# Patient Record
Sex: FEMALE | Race: WHITE | Employment: OTHER | URBAN - METROPOLITAN AREA
[De-identification: names, ages, dates, MRNs, and addresses within clinical notes are randomized per-mention and may not be internally consistent; named-entity substitution may affect disease eponyms.]

---

## 2018-06-25 ENCOUNTER — COMPLETE SKIN EXAM (OUTPATIENT)
Dept: URBAN - METROPOLITAN AREA CLINIC 14 | Facility: CLINIC | Age: 53
Setting detail: DERMATOLOGY
End: 2018-06-25

## 2018-06-25 PROCEDURE — 99214 OFFICE O/P EST MOD 30 MIN: CPT

## 2021-01-06 ENCOUNTER — OTHER- (OUTPATIENT)
Dept: URBAN - METROPOLITAN AREA CLINIC 14 | Facility: CLINIC | Age: 56
Setting detail: DERMATOLOGY
End: 2021-01-06

## 2021-01-06 DIAGNOSIS — D48.5 NEOPLASM OF UNCERTAIN BEHAVIOR OF SKIN: ICD-10-CM

## 2021-01-06 PROCEDURE — 99212 OFFICE O/P EST SF 10 MIN: CPT

## 2021-03-08 ENCOUNTER — OTHER- (OUTPATIENT)
Dept: URBAN - METROPOLITAN AREA CLINIC 14 | Facility: CLINIC | Age: 56
Setting detail: DERMATOLOGY
End: 2021-03-08

## 2021-03-08 DIAGNOSIS — B07.8 OTHER VIRAL WARTS: ICD-10-CM

## 2021-03-08 PROCEDURE — OTHER COSMETIC: OTHER

## 2021-04-07 ENCOUNTER — WEB ENCOUNTER (OUTPATIENT)
Dept: URBAN - METROPOLITAN AREA CLINIC 86 | Facility: CLINIC | Age: 56
End: 2021-04-07

## 2021-04-09 ENCOUNTER — WEB ENCOUNTER (OUTPATIENT)
Dept: URBAN - METROPOLITAN AREA CLINIC 86 | Facility: CLINIC | Age: 56
End: 2021-04-09

## 2021-04-11 ENCOUNTER — TELEPHONE ENCOUNTER (OUTPATIENT)
Dept: URBAN - METROPOLITAN AREA CLINIC 92 | Facility: CLINIC | Age: 56
End: 2021-04-11

## 2021-04-12 ENCOUNTER — LAB OUTSIDE AN ENCOUNTER (OUTPATIENT)
Dept: URBAN - METROPOLITAN AREA TELEHEALTH 2 | Facility: TELEHEALTH | Age: 56
End: 2021-04-12

## 2021-04-12 ENCOUNTER — WEB ENCOUNTER (OUTPATIENT)
Dept: URBAN - METROPOLITAN AREA CLINIC 86 | Facility: CLINIC | Age: 56
End: 2021-04-12

## 2021-04-12 ENCOUNTER — OFFICE VISIT (OUTPATIENT)
Dept: URBAN - METROPOLITAN AREA TELEHEALTH 2 | Facility: TELEHEALTH | Age: 56
End: 2021-04-12
Payer: COMMERCIAL

## 2021-04-12 DIAGNOSIS — R53.83 FATIGUE: ICD-10-CM

## 2021-04-12 DIAGNOSIS — K76.89 LIVER LESION: ICD-10-CM

## 2021-04-12 DIAGNOSIS — R10.84 ABDOMINAL CRAMPING, GENERALIZED: ICD-10-CM

## 2021-04-12 DIAGNOSIS — R93.5 ABNORMAL MRI OF ABDOMEN: ICD-10-CM

## 2021-04-12 PROCEDURE — 99244 OFF/OP CNSLTJ NEW/EST MOD 40: CPT

## 2021-04-12 PROCEDURE — 99204 OFFICE O/P NEW MOD 45 MIN: CPT

## 2021-04-12 RX ORDER — TESTOSTERONE 100 MG
112 MG PELLET (EA) IMPLANTATION ONCE
Status: ACTIVE | COMMUNITY

## 2021-04-12 RX ORDER — THYROID,PORK 100 % USP
10 MCG POWDER (GRAM) MISCELLANEOUS ONCE A DAY
Status: ACTIVE | COMMUNITY

## 2021-04-12 NOTE — HPI-TODAY'S VISIT:
Pt is wife of Jorge Ling who is our pt as well and she is 55 yo Female and she is being Referred Dr Sowmya Smith in [place] and coming in for liver lesions.  A copy of the note will be sent to the referring provider.  She says liver lesions first noted 3-18 with hospital for chest pain and she did a ct and saw a liver lesion.   they wnated her to do test and to do mri.   She decided to leave as hospital busy.  Mri showed 2 lesions one larger to center and to left of the liver 8cm in size by 3cm and she other almost and several other small ones.  She says when went to doctor saw covering doctor.   She says not needing follow up and covering doctor said her pain was there.  They suggested benign hepatic lesion but not specific.  We need the ct and mri discs so that we can get the info that we need.  Need to get the discs reread here.  She was prior years ago on ocp and she was on march 3 was on estradiol patch and march 17 had developed pain and stopped march 24 the week after and then stopped it then.  She says she is perimenopause.  Plan: 1. Need to get the missing records sent in or found. 2. She carole get the discs of the ct and mri bruned onto disc and need send that and the reports. 3. If the mri is not clear then we can consider redo now or in 3m or 6m or do with eovist. 4. If the lesions are benign and in pain then we need to look options for tx.  Cautioned pt re the pandemic to use strict hand washing, wear mask even if the covid 19 vaccine is or has not been obtained, socially distance, and look to the cdc web page or announcements for  updates as this a moving target and lots of updates coming at us re the COVID 19.  Duration of the visit 50 min with 10 min of prep time looking for info re the pt and checking for records in office and then the 40 min from 820 to 900 am as healow clock fluxed with internet fluxes for the clinic telemed visit via [employer] with time off due to inetrnet fluxing.   CC: Dr Sowmya Le MD Address: [address] [place] [address] [address], [place] [address] Phone: [phone]

## 2021-04-13 ENCOUNTER — WEB ENCOUNTER (OUTPATIENT)
Dept: URBAN - METROPOLITAN AREA CLINIC 86 | Facility: CLINIC | Age: 56
End: 2021-04-13

## 2021-04-13 ENCOUNTER — TELEPHONE ENCOUNTER (OUTPATIENT)
Dept: URBAN - METROPOLITAN AREA CLINIC 92 | Facility: CLINIC | Age: 56
End: 2021-04-13

## 2021-04-13 LAB
A/G RATIO: 1.8
ALBUMIN: 4.6
ALKALINE PHOSPHATASE: 90
ALT (SGPT): 22
AST (SGOT): 17
BASO (ABSOLUTE): 0.1
BASOS: 1
BILIRUBIN, TOTAL: 0.6
BUN/CREATININE RATIO: 20
BUN: 14
CALCIUM: 9.5
CARBON DIOXIDE, TOTAL: 22
CHLORIDE: 102
CREATININE: 0.69
EGFR IF AFRICN AM: 114
EGFR IF NONAFRICN AM: 99
EOS (ABSOLUTE): 0.1
EOS: 1
GLOBULIN, TOTAL: 2.6
GLUCOSE: 129
HBSAG SCREEN: NEGATIVE
HCV AB: <0.1
HEMATOCRIT: 41.9
HEMATOLOGY COMMENTS:: (no result)
HEMOGLOBIN: 13.6
HEP A AB, TOTAL: NEGATIVE
HEP B CORE AB, TOT: NEGATIVE
HEPATITIS B SURF AB QUANT: <3.1
IMMATURE CELLS: (no result)
IMMATURE GRANS (ABS): 0
IMMATURE GRANULOCYTES: 0
INTERPRETATION:: (no result)
LYMPHS (ABSOLUTE): 3.1
LYMPHS: 32
MCH: 28.3
MCHC: 32.5
MCV: 87
MONOCYTES(ABSOLUTE): 0.5
MONOCYTES: 5
NEUTROPHILS (ABSOLUTE): 5.9
NEUTROPHILS: 61
NRBC: (no result)
PLATELETS: 349
POTASSIUM: 4.8
PROTEIN, TOTAL: 7.2
RBC: 4.8
RDW: 13
SODIUM: 140
WBC: 9.7

## 2021-04-13 NOTE — HPI-TODAY'S VISIT:
April 12 labs show white blood cell count 9.7 hemoglobin 13.6 platelet count 349.  MCV 87.  Neutrophils were normal at 5.9.  Sodium 140 potassium 4.8 chloride 102 CO2 22 calcium 9.5 albumin 4.6 bilirubin 0.6 alkaline phosphatase 90 AST 17 ALT of 22.  Ideal ALT is less than 25.  Glucose was elevated at 129.  Maybe not fasting?  Show local provider. BUN of 14 creatinine 0.69.  Had a immunity not seen.  Hep B immunity not seen.  Hep B core total negative.  Hep B surface antigen negative.  Would recommend that you get the hepatitis A and B vaccine series.  It comes as a combined vaccine called Twinrix and you have to get it at 0, 1, and 6 months to have a good chance for immunity to the form.  Remember if you get the new coronavirus vaccine you have to not have any other vaccines given to you for 2 weeks before or after any other coronavirus vaccine doses. Hep C antibody was negative. Await the scans to have these reviewed by radiology here.

## 2021-04-14 ENCOUNTER — WEB ENCOUNTER (OUTPATIENT)
Dept: URBAN - METROPOLITAN AREA CLINIC 86 | Facility: CLINIC | Age: 56
End: 2021-04-14

## 2021-04-16 ENCOUNTER — WEB ENCOUNTER (OUTPATIENT)
Dept: URBAN - METROPOLITAN AREA CLINIC 86 | Facility: CLINIC | Age: 56
End: 2021-04-16

## 2021-04-17 ENCOUNTER — TELEPHONE ENCOUNTER (OUTPATIENT)
Dept: URBAN - METROPOLITAN AREA CLINIC 92 | Facility: CLINIC | Age: 56
End: 2021-04-17

## 2021-04-17 ENCOUNTER — LAB OUTSIDE AN ENCOUNTER (OUTPATIENT)
Dept: URBAN - METROPOLITAN AREA CLINIC 92 | Facility: CLINIC | Age: 56
End: 2021-04-17

## 2021-04-17 NOTE — HPI-TODAY'S VISIT:
Quinten Ling, CT chest April 15 review by radiology here suggested she have three-vessel aortic arch noted.  No significant atherosclerotic disease was seen.  No significant coronary artery calcification was seen.  Normal caliber to the main pulmonary artery was seen.  There was no central filling defect that was seen.  Heart size was normal with no pericardial effusion.  Did see a small hiatal hernia.  No pathologically enlarged lymph nodes were seen.  The airways were patent.  They did see a cluster of micronodules within the right lower lobe which they said could be infectious or inflammatory etiology of the recommended attention on follow-up.  I would recommend that you follow-up with your local doctor regarding that.  They did mention again that they saw the multiple liver lesions largest being in segment 7/8.  They also saw some fluid attenuating simple appearing renal cyst within the right kidney up to 1.5 x 1.5 cm. MRI also read on April 15.  We mentioned that the liver had no fat or prior.  They did mention that intralesional T2 hyperintense stellate-like scars were seen in the hands and delayed phases and the larger lesions.  No evidence of intralesional fat was seen.  Several of the smaller lesions are visible on the diffusion sequences and/or the CT.  Examples I gave her in segment 8 of 1.7 x 7.2 cm, segment 7 had a 5.6 x 3.3 cm 1, in segment 1 these are 1.3 x 1.9 cm 1. An exophytic 6.5 x 7.9 cm lesion was seen in the gastrosplenic ligament adjacent to the left lobe and the greater curvature of the stomach.  This 1 also had the central stellate-like scar that was seen.  The CT much better demonstrates a left hepatic arterial branches coursing into this exophytic lesion proving its hepatic origin.  Lesion of similar intensity was seen in segment 5/6 measuring 2.3 x 3.6 cm as well. The biliary tree appeared to be normal, spleen was normal, pancreas was normal, kidney showed a renal cyst. They felt that these lesions in the liver are very suggestive of focal nodular hyperplasia.  The largest being an exophytic lesion arising from the left lobe measuring 7.9 cm.  The did recommend further evaluation with a Eovist for definitive diagnosis. Typically what this would do would be to do the neck scan now as an Eovist exam to be sure and we will put in the order for this.  Spoke with pt: Explained need the mri with eovist to be done to better see the liver lesions.  Also mentioned to have primary MD review the chest ct nonspecific findings seen.  Dr. Hernandez

## 2021-04-18 ENCOUNTER — TELEPHONE ENCOUNTER (OUTPATIENT)
Dept: URBAN - METROPOLITAN AREA CLINIC 92 | Facility: CLINIC | Age: 56
End: 2021-04-18

## 2021-04-18 NOTE — HPI-TODAY'S VISIT:
Quinten Ling, April 8 labs:  Sodium 139 k 4.2 chloride 104 CO2 25 glucose 96 BUN of 18 creatinine 0.8 calcium 9.3 corrected calcium 9.4 albumin 3.9 bilirubin 0.6 alkaline phosphatase 70 AST 17 ALT of 18.  Liver enzymes are at ideal level.  Beta natruretic peptide was normal at 13.  Lipase was normal at 13.  Magnesium was normal at 2.1.  TSH 1.832.  White count 10.6 hemoglobin 12.7 plate count 341.  Neutrophils were 5.8.  MCV normal at 90.5.  INR normal 1.0.  Saw also echocardiogram dated April 5 shows an ejection fraction of 55 to 60% right ventricle normal in size and function.  Dr. Hernandez

## 2021-04-20 ENCOUNTER — WEB ENCOUNTER (OUTPATIENT)
Dept: URBAN - METROPOLITAN AREA CLINIC 86 | Facility: CLINIC | Age: 56
End: 2021-04-20

## 2021-04-22 ENCOUNTER — TELEPHONE ENCOUNTER (OUTPATIENT)
Dept: URBAN - METROPOLITAN AREA CLINIC 86 | Facility: CLINIC | Age: 56
End: 2021-04-22

## 2021-05-04 ENCOUNTER — OFFICE VISIT (OUTPATIENT)
Dept: URBAN - METROPOLITAN AREA CLINIC 86 | Facility: CLINIC | Age: 56
End: 2021-05-04

## 2021-05-10 ENCOUNTER — WEB ENCOUNTER (OUTPATIENT)
Dept: URBAN - METROPOLITAN AREA CLINIC 86 | Facility: CLINIC | Age: 56
End: 2021-05-10

## 2021-05-12 ENCOUNTER — WEB ENCOUNTER (OUTPATIENT)
Dept: URBAN - METROPOLITAN AREA CLINIC 86 | Facility: CLINIC | Age: 56
End: 2021-05-12

## 2021-05-17 ENCOUNTER — WEB ENCOUNTER (OUTPATIENT)
Dept: URBAN - METROPOLITAN AREA CLINIC 86 | Facility: CLINIC | Age: 56
End: 2021-05-17

## 2021-05-17 ENCOUNTER — TELEPHONE ENCOUNTER (OUTPATIENT)
Dept: URBAN - METROPOLITAN AREA CLINIC 92 | Facility: CLINIC | Age: 56
End: 2021-05-17

## 2021-05-17 NOTE — HPI-TODAY'S VISIT:
She called re netta. Liver test abnormalities have been reported to occur in less than 1% of patients on citalopram, and elevations are usually modest and rarely require dose modification or discontinuation. Rare instances of acute, clinically apparent episodes of liver injury with marked liver enzyme elevations with or without jaundice have been reported in patients on citalopram and escitalopram. The typical presentation is with fatigue, nausea and abdominal pain 2 to 10 weeks after starting the medication, followed by dark urine and mild jaundice. Both cholestatic and hepatocellular patterns of serum enzyme elevations have been described. Autoimmune (autoantibodies) and immunoallergic features (rash, fever, eosinophilia) are uncommon. Recovery is usually rapid once the agent is stopped.  Likelihood score: C (probable cause of clinically apparent liver injury).  Would delay visit to when  mri set up.  We need to follow labs occ to see how doing it. Typically do labs in 1m and 3m on any new med.

## 2021-05-25 ENCOUNTER — TELEPHONE ENCOUNTER (OUTPATIENT)
Dept: URBAN - METROPOLITAN AREA CLINIC 92 | Facility: CLINIC | Age: 56
End: 2021-05-25

## 2021-05-25 NOTE — HPI-TODAY'S VISIT:
Quinten Ling, May 20 MRI done with Eovist. They see the liver has no fat or iron but with multiple lesions seen. Some of them have clear central scars with persistent enhancement favoring focal nodular hyperplasia such as in segment 8 given 8.2 x 6.8 cm lesion, segment 7 a 7.2 x 4.2 cm lesion and in segment two 2.1 x 1.6 cm lesion. Additional lesions have internal fat and washout on delayed imaging which favors hepatic adenomas this includes the dominant lesion in segment 2 measuring 8.9 x 8.2 cm as well as additional lesions in segment 4A which was 2.5 x 1.9 and in segment three 2.3 x 1.9 cm and in segment eight 2.4 x 1.3 cm and she 6 1.2x0.8cm.  Additional subcentimeter lesions seen throughout the liver. GB and biliary tree normal. Spleen normal and kidneys show small right renal cysts. Liver vessels patent.  Overall they felt you had a combination of focal nodular hyperplasias and adenomas.  They said that they did not have comparison films to compare to. Dr Hernandez

## 2021-06-01 ENCOUNTER — OFFICE VISIT (OUTPATIENT)
Dept: URBAN - METROPOLITAN AREA CLINIC 86 | Facility: CLINIC | Age: 56
End: 2021-06-01
Payer: COMMERCIAL

## 2021-06-01 DIAGNOSIS — N95.1 PERIMENOPAUSAL: ICD-10-CM

## 2021-06-01 DIAGNOSIS — Z71.89 VACCINE COUNSELING: ICD-10-CM

## 2021-06-01 DIAGNOSIS — Z83.71 FAMILY HISTORY OF POLYPS IN THE COLON: ICD-10-CM

## 2021-06-01 DIAGNOSIS — R93.5 ABNORMAL MRI OF ABDOMEN: ICD-10-CM

## 2021-06-01 DIAGNOSIS — R10.9 ABDOMINAL PAIN: ICD-10-CM

## 2021-06-01 DIAGNOSIS — Z79.899 HIGH RISK MEDICATION USE: ICD-10-CM

## 2021-06-01 DIAGNOSIS — K76.89 LIVER LESION: ICD-10-CM

## 2021-06-01 DIAGNOSIS — Z98.890 HISTORY OF COLONOSCOPY: ICD-10-CM

## 2021-06-01 DIAGNOSIS — E03.9 HYPOTHYROIDISM, UNSPECIFIED TYPE: ICD-10-CM

## 2021-06-01 DIAGNOSIS — R53.83 FATIGUE: ICD-10-CM

## 2021-06-01 PROCEDURE — 99214 OFFICE O/P EST MOD 30 MIN: CPT | Performed by: PHYSICIAN ASSISTANT

## 2021-06-01 RX ORDER — TESTOSTERONE 100 MG
112 MG PELLET (EA) IMPLANTATION ONCE
Status: ACTIVE | COMMUNITY

## 2021-06-01 RX ORDER — THYROID,PORK 100 % USP
10 MCG POWDER (GRAM) MISCELLANEOUS ONCE A DAY
Status: ACTIVE | COMMUNITY

## 2021-06-01 NOTE — HPI-TODAY'S VISIT:
This is a scheduled follow-up appointment for this patient, a 56 year old female, after a previous telehealth visit in April 2021, initially referred by Dr. Sowmya Smith in [place] for an evaluation for multiple liver lesions. A copy of the note will be sent to the referring provider.   5/20/21 MRI with Eovist. They see the liver has no fat or iron but with multiple lesions seen. Some of them have clear central scars with persistent enhancement favoring focal nodular hyperplasia such as in segment 8 given 8.2 x 6.8 cm lesion, segment 7 a 7.2 x 4.2 cm lesion and in segment two 2.1 x 1.6 cm lesion. Additional lesions have internal fat and washout on delayed imaging which favors hepatic adenomas this includes the dominant lesion in segment 2 measuring 8.9 x 8.2 cm as well as additional lesions in segment 4A which was 2.5 x 1.9 and in segment three 2.3 x 1.9 cm and in segment eight 2.4 x 1.3 cm and she 6 1.2x0.8cm.  Additional subcentimeter lesions seen throughout the liver. GB and biliary tree normal. Spleen normal and kidneys show small right renal cysts. Liver vessels patent.  Overall they felt you had a combination of focal nodular hyperplasias and adenomas.   April 8 labs:  Sodium 139 k 4.2 chloride 104 CO2 25 glucose 96 BUN of 18 creatinine 0.8 calcium 9.3 corrected calcium 9.4 albumin 3.9 bilirubin 0.6 alkaline phosphatase 70 AST 17 ALT of 18.  Liver enzymes are at ideal level.  Beta natruretic peptide was normal at 13.  Lipase was normal at 13.  Magnesium was normal at 2.1.  TSH 1.832.  White count 10.6 hemoglobin 12.7 plate count 341.  Neutrophils were 5.8.  MCV normal at 90.5.  INR normal 1.0.  Saw also echocardiogram dated April 5 shows an ejection fraction of 55 to 60% right ventricle normal in size and function.  CT chest April 15 review by radiology here suggested she have three-vessel aortic arch noted.  No significant atherosclerotic disease was seen.  No significant coronary artery calcification was seen.  Normal caliber to the main pulmonary artery was seen.  There was no central filling defect that was seen.  Heart size was normal with no pericardial effusion.  Did see a small hiatal hernia.  No pathologically enlarged lymph nodes were seen.  The airways were patent.  They did see a cluster of micronodules within the right lower lobe which they said could be infectious or inflammatory etiology of the recommended attention on follow-up.  I would recommend that you follow-up with your local doctor regarding that.  They did mention again that they saw the multiple liver lesions largest being in segment 7/8.  They also saw some fluid attenuating simple appearing renal cyst within the right kidney up to 1.5 x 1.5 cm.  MRI also read on April 15.  We mentioned that the liver had no fat or prior.  They did mention that intralesional T2 hyperintense stellate-like scars were seen in the hands and delayed phases and the larger lesions.  No evidence of intralesional fat was seen.  Several of the smaller lesions are visible on the diffusion sequences and/or the CT.  Examples I gave her in segment 8 of 1.7 x 7.2 cm, segment 7 had a 5.6 x 3.3 cm 1, in segment 1 these are 1.3 x 1.9 cm 1. An exophytic 6.5 x 7.9 cm lesion was seen in the gastrosplenic ligament adjacent to the left lobe and the greater curvature of the stomach.  This 1 also had the central stellate-like scar that was seen.  The CT much better demonstrates a left hepatic arterial branches coursing into this exophytic lesion proving its hepatic origin.  Lesion of similar intensity was seen in segment 5/6 measuring 2.3 x 3.6 cm as well. The biliary tree appeared to be normal, spleen was normal, pancreas was normal, kidney showed a renal cyst. They felt that these lesions in the liver are very suggestive of focal nodular hyperplasia.  The largest being an exophytic lesion arising from the left lobe measuring 7.9 cm.  The did recommend further evaluation with a Eovist for definitive diagnosis.  Spoke with pt: Explained need the mri with eovist to be done to better see the liver lesions.  Also mentioned to have primary MD review the chest ct nonspecific findings seen.  She stays liver lesions first noted 3/18 with hospital for epigastric/chest pain.  Went to [place] [facility] and CT showed a liver lesion.  MRI showed 2 lesions, one larger to center and to left of the liver 8 x 3 cm and several other smaller ones.    Normal cardiac work up.  She has hx of OCP use many  years ago (about 2 years total) and was on estradiol patch March 3.  On March 17, she developed pain and stopped  patch March 24.  Testosterone pellets x 1.5 years ago, last in Feb 2021.  ALso hx of progesterone supplementation with one prior pregnancy.     Abdominal pain is epigastric, constant 1/10 but does sometimes gets worse.  Slouching or overeating can make pain worse.  Admits to early satiety.  Denies nausea/vomiting.  Admits to diarrhea.   No fevers/chills.  No weight loss.   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

## 2021-06-10 ENCOUNTER — TELEPHONE ENCOUNTER (OUTPATIENT)
Dept: URBAN - METROPOLITAN AREA CLINIC 6 | Facility: CLINIC | Age: 56
End: 2021-06-10

## 2021-08-09 ENCOUNTER — OFFICE VISIT (OUTPATIENT)
Dept: URBAN - METROPOLITAN AREA CLINIC 86 | Facility: CLINIC | Age: 56
End: 2021-08-09
Payer: COMMERCIAL

## 2021-08-09 DIAGNOSIS — D13.4 HEPATIC ADENOMA: ICD-10-CM

## 2021-08-09 DIAGNOSIS — Z79.899 HIGH RISK MEDICATION USE: ICD-10-CM

## 2021-08-09 DIAGNOSIS — R93.5 ABNORMAL MRI OF ABDOMEN: ICD-10-CM

## 2021-08-09 DIAGNOSIS — K76.89 LIVER LESION: ICD-10-CM

## 2021-08-09 DIAGNOSIS — E03.9 HYPOTHYROIDISM, UNSPECIFIED TYPE: ICD-10-CM

## 2021-08-09 DIAGNOSIS — N95.1 PERIMENOPAUSAL: ICD-10-CM

## 2021-08-09 DIAGNOSIS — R53.83 FATIGUE: ICD-10-CM

## 2021-08-09 DIAGNOSIS — Z83.71 FAMILY HISTORY OF POLYPS IN THE COLON: ICD-10-CM

## 2021-08-09 DIAGNOSIS — R10.9 ABDOMINAL PAIN: ICD-10-CM

## 2021-08-09 DIAGNOSIS — Z98.890 HISTORY OF COLONOSCOPY: ICD-10-CM

## 2021-08-09 DIAGNOSIS — Z71.89 VACCINE COUNSELING: ICD-10-CM

## 2021-08-09 PROCEDURE — 99214 OFFICE O/P EST MOD 30 MIN: CPT

## 2021-08-09 RX ORDER — THYROID,PORK 100 % USP
10 MCG POWDER (GRAM) MISCELLANEOUS ONCE A DAY
Status: ACTIVE | COMMUNITY

## 2021-08-09 RX ORDER — TESTOSTERONE 100 MG
112 MG PELLET (EA) IMPLANTATION ONCE
Status: ACTIVE | COMMUNITY

## 2021-08-26 ENCOUNTER — WEB ENCOUNTER (OUTPATIENT)
Dept: URBAN - METROPOLITAN AREA CLINIC 86 | Facility: CLINIC | Age: 56
End: 2021-08-26

## 2021-10-25 ENCOUNTER — WEB ENCOUNTER (OUTPATIENT)
Dept: URBAN - METROPOLITAN AREA CLINIC 86 | Facility: CLINIC | Age: 56
End: 2021-10-25

## 2021-10-29 ENCOUNTER — WEB ENCOUNTER (OUTPATIENT)
Dept: URBAN - METROPOLITAN AREA CLINIC 86 | Facility: CLINIC | Age: 56
End: 2021-10-29

## 2021-10-30 ENCOUNTER — LAB OUTSIDE AN ENCOUNTER (OUTPATIENT)
Dept: URBAN - METROPOLITAN AREA CLINIC 86 | Facility: CLINIC | Age: 56
End: 2021-10-30

## 2021-11-16 ENCOUNTER — TELEPHONE ENCOUNTER (OUTPATIENT)
Dept: URBAN - METROPOLITAN AREA CLINIC 92 | Facility: CLINIC | Age: 56
End: 2021-11-16

## 2021-11-16 LAB
A/G RATIO: 1.5
ALBUMIN: 4
ALKALINE PHOSPHATASE: 83
ALT (SGPT): 26
AST (SGOT): 20
BASO (ABSOLUTE): 0.1
BASOS: 1
BILIRUBIN, TOTAL: 0.4
BUN/CREATININE RATIO: 15
BUN: 10
CALCIUM: 9.1
CARBON DIOXIDE, TOTAL: 23
CHLORIDE: 99
CREATININE: 0.68
EGFR IF AFRICN AM: 115
EGFR IF NONAFRICN AM: 99
EOS (ABSOLUTE): 0.1
EOS: 1
GLOBULIN, TOTAL: 2.6
GLUCOSE: 89
HEMATOCRIT: 37.4
HEMATOLOGY COMMENTS:: (no result)
HEMOGLOBIN: 12.3
IMMATURE CELLS: (no result)
IMMATURE GRANS (ABS): 0
IMMATURE GRANULOCYTES: 0
INR: 1
LYMPHS (ABSOLUTE): 3
LYMPHS: 30
MCH: 27
MCHC: 32.9
MCV: 82
MONOCYTES(ABSOLUTE): 0.6
MONOCYTES: 6
NEUTROPHILS (ABSOLUTE): 6.1
NEUTROPHILS: 62
NRBC: (no result)
PLATELETS: 298
POTASSIUM: 4.3
PROTEIN, TOTAL: 6.6
PROTHROMBIN TIME: 10.4
RBC: 4.55
RDW: 14
SODIUM: 139
WBC: 9.9

## 2021-11-16 NOTE — HPI-TODAY'S VISIT:
Deajeramie Ling, November 15 labs show normal INR at 1.0 glucose 89 BUN of 10 creatinine 0.68 sodium 139 potassium 4.3 calcium 9.1 albumin 4.0 bilirubin 0.4 alkaline phosphatase 83 AST 20 and ALT 26.  These are in normal range with ideal ALT being slightly lower at 25 or less. White blood cell count 9.9 hemoglobin 12.3 platelet count 298 MCV 92 and neutrophils 6.1 lymphocytes 3.0. Overall these laboratories are all within lab normal range. Dr Hernandez

## 2021-12-27 ENCOUNTER — LAB OUTSIDE AN ENCOUNTER (OUTPATIENT)
Dept: URBAN - METROPOLITAN AREA CLINIC 86 | Facility: CLINIC | Age: 56
End: 2021-12-27

## 2021-12-28 LAB
A/G RATIO: 1.6
ALBUMIN: 4.2
ALKALINE PHOSPHATASE: 92
ALT (SGPT): 29
AST (SGOT): 20
BASO (ABSOLUTE): 0.2
BASOS: 2
BILIRUBIN, TOTAL: 0.5
BUN/CREATININE RATIO: 21
BUN: 12
CALCIUM: 9.3
CARBON DIOXIDE, TOTAL: 23
CHLORIDE: 102
CREATININE: 0.56
EGFR IF AFRICN AM: 122
EGFR IF NONAFRICN AM: 106
EOS (ABSOLUTE): 0.2
EOS: 2
GLOBULIN, TOTAL: 2.7
GLUCOSE: 89
HEMATOCRIT: 41.9
HEMATOLOGY COMMENTS:: (no result)
HEMOGLOBIN: 14.3
IMMATURE CELLS: (no result)
IMMATURE GRANS (ABS): 0
IMMATURE GRANULOCYTES: 0
LYMPHS (ABSOLUTE): 3.4
LYMPHS: 40
MCH: 28.3
MCHC: 34.1
MCV: 83
MONOCYTES(ABSOLUTE): 0.4
MONOCYTES: 5
NEUTROPHILS (ABSOLUTE): 4.3
NEUTROPHILS: 51
NRBC: (no result)
PLATELETS: 360
POTASSIUM: 4.4
PROTEIN, TOTAL: 6.9
RBC: 5.05
RDW: 14.3
SODIUM: 139
WBC: 8.5

## 2021-12-30 ENCOUNTER — TELEPHONE ENCOUNTER (OUTPATIENT)
Dept: URBAN - METROPOLITAN AREA CLINIC 92 | Facility: CLINIC | Age: 56
End: 2021-12-30

## 2021-12-30 NOTE — HPI-TODAY'S VISIT:
Quinten Ling, Dec 28 mri read out today: Lower thorax where seen showed only trace bilateral pleural effusions. Liver showed no fat or iron.  They saw interval resection since last mri of the previously dominant exophytic hepatic adenoma in segment 2/3 without any findings of local recurrence.   They still see multiple arterially enhancing lesions consistent with simple scar consistent consistent with focal nodular hyperplasia.  Overall these lesions are similar in size and appearance to the May 2021 study with the largest being an 8.2 x 6.8 cm lesion in segment 8, 5.2 x 3.6 cm in segment 7, 2.1 x 1.6 cm in segment 2, and 2.2 x 1.4 cm in the caudate lobe. Some other additional arterial enhancing lesions with internal fat seen that were felt to be favoring hepatic adenomas also seen including in segment 4A a 2.5 x 1.9 cm lesion, a segment three 2.3 x 1.9 lesion, segment eight 2.5 x 2.1 cm lesion, and segment 6 of 4.2 x 2.9 cm lesion as well. Multiple additional smaller scattered hepatic adenomas seen in the right and left hepatic lobes. Gallbladder/biliary tree were normal, as were spleen, pancreas, adrenals and lymph nodes. Kidneys showed renal cysts but no hydronephrosis.   Another new finding was this 3.4 x 4.9 cm right nonenhancing fluid collection seen in the lateral right breast. Small fat-containing ventral hernia seen as well as scattered vertebral body hemangiomas. Overall they saw interval resection of the segment 2/3 large exophytic hepatic adenoma since the last scan and without local recurrence.  They aslo saw a combination of FNH and adenomas that persist.  They suspect that you have a hematoma noted in the lateral right breast soft tissues. We need to redo the mri in 6m. Called the pt and she had an excisional biopsy done of the breast area. 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 She says offered tamoxifen and she is worried re about its effects and she says she is exploring other options including more surgery for this. Dr. Hernandez

## 2022-01-03 ENCOUNTER — OFFICE VISIT (OUTPATIENT)
Dept: URBAN - METROPOLITAN AREA TELEHEALTH 2 | Facility: TELEHEALTH | Age: 57
End: 2022-01-03
Payer: COMMERCIAL

## 2022-01-03 DIAGNOSIS — D05.01 LOBULAR CARCINOMA IN SITU OF RIGHT BREAST: ICD-10-CM

## 2022-01-03 DIAGNOSIS — R93.5 ABNORMAL MRI OF ABDOMEN: ICD-10-CM

## 2022-01-03 DIAGNOSIS — K76.89 NODULAR HYPERPLASIA OF LIVER: ICD-10-CM

## 2022-01-03 DIAGNOSIS — D13.4 HEPATIC ADENOMA: ICD-10-CM

## 2022-01-03 PROCEDURE — 99214 OFFICE O/P EST MOD 30 MIN: CPT

## 2022-01-03 RX ORDER — ESCITALOPRAM OXALATE 5 MG/1
1 TABLET TABLET, FILM COATED ORAL ONCE A DAY
Status: ACTIVE | COMMUNITY

## 2022-01-03 NOTE — HPI-TODAY'S VISIT:
This is a scheduled follow-up appointment for this patient, a 56 year old female, after a previous office visit in Aug 2021 Stefanie MCKINNEY and prior June 2021 by Nyla Patel PA-C, initially referred by Dr. Sowmya Smith in [place] for an evaluation for multiple liver  lesions.   A copy of the note will be sent to the referring provider.   Dec 28 mri read out today: Lower thorax where seen showed only trace bilateral pleural effusions. Liver showed no fat or iron.  They saw interval resection since last mri of the previously dominant exophytic hepatic adenoma in segment 2/3 without any findings of local recurrence.   They still see multiple arterially enhancing lesions consistent with simple scar consistent consistent with focal nodular hyperplasia.  Overall these lesions are similar in size and appearance to the May 2021 study with the largest being an 8.2 x 6.8 cm lesion in segment 8, 5.2 x 3.6 cm in segment 7, 2.1 x 1.6 cm in segment 2, and 2.2 x 1.4 cm in the caudate lobe. Some other additional arterial enhancing lesions with internal fat seen that were felt to be favoring hepatic adenomas also seen including in segment 4A a 2.5 x 1.9 cm lesion, a segment three 2.3 x 1.9 lesion, segment eight 2.5 x 2.1 cm lesion, and segment 6 of 4.2 x 2.9 cm lesion as well. Multiple additional smaller scattered hepatic adenomas seen in the right and left hepatic lobes. Gallbladder/biliary tree were normal, as were spleen, pancreas, adrenals and lymph nodes. Kidneys showed renal cysts but no hydronephrosis.   Another new finding was this 3.4 x 4.9 cm right nonenhancing fluid collection seen in the lateral right breast. Small fat-containing ventral hernia seen as well as scattered vertebral body hemangiomas. Overall they saw interval resection of the segment 2/3 large exophytic hepatic adenoma since the last scan and without local recurrence.  They aslo saw a combination of FNH and adenomas that persist.  They suspect that you have a hematoma noted in the lateral right breast soft tissues. We need to redo the mri in 6m. Called the pt and she had an excisional biopsy done of the breast area. 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 She says offered tamoxifen and she is worried re about its effects and she says she is exploring other options including more surgery for this.  She says she did talk with breast surgeon re the lumpectomy and bx and she had offered meds and she was concerned and she says could do weight and watch and scans closely. She was worried and did not like this and to do consult with Jan 18 2022 for bilateral mastectomy.  Friday did the mri last week and does not have the result yet.  November 15 labs show normal INR at 1.0 glucose 89 BUN of 10 creatinine 0.68 sodium 139 potassium 4.3 calcium 9.1 albumin 4.0 bilirubin 0.4 alkaline phosphatase 83 AST 20 and ALT 26.  These are in normal range with ideal ALT being slightly lower at 25 or less.White blood cell count 9.9 hemoglobin 12.3 platelet count 298 MCV 92 and neutrophils 6.1 lymphocytes 3.0. Overall these laboratories are all within lab normal range.  She was on hormones for 15 days for the patch and then stopped it and that was when she felt the chest diff and then did the bx.   Ms. Ling is s/p laparoscopic left lateral sectorectomy on 6/29/21 for a dominant exophytic 9.1 cm hepatic adenoma of left lateral lobe with Dr. Teodoro Santiago.  She is recovering well aside from persistent diarrhea/constipation, last BM 3 days ago- taking fiber.   Will f/u with GI if symptoms persist.  Pain well controlled with occasional need for tylenol. Pathology showed adenoma, negative for carcinoma and margins negative.  Following up Dr. Santiago PRN.   Will need follow up imaging to evaluate remaining adenomas/FNHs.    6/29/21 Additional Clinical Information  Pathologic Diagnosis A.  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    RESECTION MARGIN IS NEGATIVE.   . NEGATIVE FOR CARCINOMA. [1]  5/20/21 MRI with Eovist. They see the liver has no fat or iron but with multiple lesions seen. Some of them have clear central scars with persistent enhancement favoring focal nodular hyperplasia such as in segment 8 given 8.2 x 6.8 cm lesion, segment 7 a 7.2 x 4.2 cm lesion and in segment two 2.1 x 1.6 cm lesion. Additional lesions have internal fat and washout on delayed imaging which favors hepatic adenomas this includes the dominant lesion in segment 2 measuring 8.9 x 8.2 cm as well as additional lesions in segment 4A which was 2.5 x 1.9 and in segment three 2.3 x 1.9 cm and in segment eight 2.4 x 1.3 cm and she 6 1.2x0.8cm.  Additional subcentimeter lesions seen throughout the liver. GB and biliary tree normal. Spleen normal and kidneys show small right renal cysts. Liver vessels patent.  Overall they felt you had a combination of focal nodular hyperplasias and adenomas.   April 8 labs:  Sodium 139 k 4.2 chloride 104 CO2 25 glucose 96 BUN of 18 creatinine 0.8 calcium 9.3 corrected calcium 9.4 albumin 3.9 bilirubin 0.6 alkaline phosphatase 70 AST 17 ALT of 18.  Liver enzymes are at ideal level.  Beta natruretic peptide was normal at 13.  Lipase was normal at 13.  Magnesium was normal at 2.1.  TSH 1.832.  White count 10.6 hemoglobin 12.7 plate count 341.  Neutrophils were 5.8.  MCV normal at 90.5.  INR normal 1.0.  Saw also echocardiogram dated April 5 shows an ejection fraction of 55 to 60% right ventricle normal in size and function.  CT chest April 15 review by radiology here suggested she have three-vessel aortic arch noted.  No significant atherosclerotic disease was seen.  No significant coronary artery calcification was seen.  Normal caliber to the main pulmonary artery was seen.  There was no central filling defect that was seen.  Heart size was normal with no pericardial effusion.  Did see a small hiatal hernia.  No pathologically enlarged lymph nodes were seen.  The airways were patent.  They did see a cluster of micronodules within the right lower lobe which they said could be infectious or inflammatory etiology of the recommended attention on follow-up.  I would recommend that you follow-up with your local doctor regarding that.  They did mention again that they saw the multiple liver lesions largest being in segment 7/8.  They also saw some fluid attenuating simple appearing renal cyst within the right kidney up to 1.5 x 1.5 cm.  MRI also read on April 15.  We mentioned that the liver had no fat or prior.  They did mention that intralesional T2 hyperintense stellate-like scars were seen in the hands and delayed phases and the larger lesions.  No evidence of intralesional fat was seen.  Several of the smaller lesions are visible on the diffusion sequences and/or the CT.  Examples I gave her in segment 8 of 1.7 x 7.2 cm, segment 7 had a 5.6 x 3.3 cm 1, in segment 1 these are 1.3 x 1.9 cm 1. An exophytic 6.5 x 7.9 cm lesion was seen in the gastrosplenic ligament adjacent to the left lobe and the greater curvature of the stomach.  This 1 also had the central stellate-like scar that was seen.  The CT much better demonstrates a left hepatic arterial branches coursing into this exophytic lesion proving its hepatic origin.  Lesion of similar intensity was seen in segment 5/6 measuring 2.3 x 3.6 cm as well. The biliary tree appeared to be normal, spleen was normal, pancreas was normal, kidney showed a renal cyst. They felt that these lesions in the liver are very suggestive of focal nodular hyperplasia.  The largest being an exophytic lesion arising from the left lobe measuring 7.9 cm.  The did recommend further evaluation with a Eovist for definitive diagnosis.  Spoke with pt: Explained need the mri with eovist to be done to better see the liver lesions.  Also mentioned to have primary MD review the chest ct nonspecific findings seen.  She stays liver lesions first noted 3/18 with hospital for epigastric/chest pain.  Went to [place] ER and CT showed a liver lesion.  MRI showed 2 lesions, one larger to center and to left of the liver 8 x 3 cm and several other smaller ones.    Normal cardiac work up.  She has hx of OCP use many  years ago (about 2 years total) and was on estradiol patch March 3.  On March 17, she developed pain and stopped  patch March 24.  Testosterone pellets x 1.5 years ago, last in Feb 2021.  Also hx of progesterone supplementation with one prior pregnancy.   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   Plan: 1. June 2022 mri to follow up the liver lesions. 2. She will follow up with local doctors re the breast lesion and the issues of the options on Jan 18 with local. 3. She will do labs again  over time. 4. She will let us know what plans are noted.  Stressed to pt the need for social distancing and strict handwashing and wearing a mask and to follow any other new or added CDC recommendations as this is an evolving target.  Duration of the visit was 36 minutes with 5 minutes of chart prep and 31 minutes of TeleMed visi today via [employer] with time spent reviewing recent records current status and future plans for the patient.

## 2022-01-04 ENCOUNTER — WEB ENCOUNTER (OUTPATIENT)
Dept: URBAN - METROPOLITAN AREA CLINIC 86 | Facility: CLINIC | Age: 57
End: 2022-01-04

## 2022-01-11 ENCOUNTER — WEB ENCOUNTER (OUTPATIENT)
Dept: URBAN - METROPOLITAN AREA CLINIC 86 | Facility: CLINIC | Age: 57
End: 2022-01-11

## 2022-05-05 ENCOUNTER — WEB ENCOUNTER (OUTPATIENT)
Dept: URBAN - METROPOLITAN AREA CLINIC 86 | Facility: CLINIC | Age: 57
End: 2022-05-05

## 2022-05-24 ENCOUNTER — WEB ENCOUNTER (OUTPATIENT)
Dept: URBAN - METROPOLITAN AREA CLINIC 86 | Facility: CLINIC | Age: 57
End: 2022-05-24

## 2022-06-01 ENCOUNTER — LAB OUTSIDE AN ENCOUNTER (OUTPATIENT)
Dept: URBAN - METROPOLITAN AREA TELEHEALTH 2 | Facility: TELEHEALTH | Age: 57
End: 2022-06-01

## 2022-06-03 ENCOUNTER — OFFICE VISIT (OUTPATIENT)
Dept: URBAN - METROPOLITAN AREA CLINIC 86 | Facility: CLINIC | Age: 57
End: 2022-06-03

## 2022-06-03 NOTE — HPI-TODAY'S VISIT:
This is a scheduled follow-up appointment for this patient, a 56 year old female, after a previous office visit in Jan 2022 and initially referred by Dr. Sowmya Smith in [place] for an evaluation for multiple liver  lesions.   A copy of the note will be sent to the referring provider.   Dec 28 mri read out today: Lower thorax where seen showed only trace bilateral pleural effusions. Liver showed no fat or iron.  They saw interval resection since last mri of the previously dominant exophytic hepatic adenoma in segment 2/3 without any findings of local recurrence.   They still see multiple arterially enhancing lesions consistent with simple scar consistent consistent with focal nodular hyperplasia.  Overall these lesions are similar in size and appearance to the May 2021 study with the largest being an 8.2 x 6.8 cm lesion in segment 8, 5.2 x 3.6 cm in segment 7, 2.1 x 1.6 cm in segment 2, and 2.2 x 1.4 cm in the caudate lobe. Some other additional arterial enhancing lesions with internal fat seen that were felt to be favoring hepatic adenomas also seen including in segment 4A a 2.5 x 1.9 cm lesion, a segment three 2.3 x 1.9 lesion, segment eight 2.5 x 2.1 cm lesion, and segment 6 of 4.2 x 2.9 cm lesion as well. Multiple additional smaller scattered hepatic adenomas seen in the right and left hepatic lobes. Gallbladder/biliary tree were normal, as were spleen, pancreas, adrenals and lymph nodes. Kidneys showed renal cysts but no hydronephrosis.   Another new finding was this 3.4 x 4.9 cm right nonenhancing fluid collection seen in the lateral right breast. Small fat-containing ventral hernia seen as well as scattered vertebral body hemangiomas. Overall they saw interval resection of the segment 2/3 large exophytic hepatic adenoma since the last scan and without local recurrence.  They aslo saw a combination of FNH and adenomas that persist.  They suspect that you have a hematoma noted in the lateral right breast soft tissues. We need to redo the mri in 6m. Called the pt and she had an excisional biopsy done of the breast area. 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 She says offered tamoxifen and she is worried re about its effects and she says she is exploring other options including more surgery for this.  She says she did talk with breast surgeon re the lumpectomy and bx and she had offered meds and she was concerned and she says could do weight and watch and scans closely. She was worried and did not like this and to do consult with Jan 18 2022 for bilateral mastectomy.  Friday did the mri last week and does not have the result yet.  November 15 labs show normal INR at 1.0 glucose 89 BUN of 10 creatinine 0.68 sodium 139 potassium 4.3 calcium 9.1 albumin 4.0 bilirubin 0.4 alkaline phosphatase 83 AST 20 and ALT 26.  These are in normal range with ideal ALT being slightly lower at 25 or less.White blood cell count 9.9 hemoglobin 12.3 platelet count 298 MCV 92 and neutrophils 6.1 lymphocytes 3.0. Overall these laboratories are all within lab normal range.  She was on hormones for 15 days for the patch and then stopped it and that was when she felt the chest diff and then did the bx.   Ms. Ling is s/p laparoscopic left lateral sectorectomy on 6/29/21 for a dominant exophytic 9.1 cm hepatic adenoma of left lateral lobe with Dr. Teodoro Santiago.  She is recovering well aside from persistent diarrhea/constipation, last BM 3 days ago- taking fiber.   Will f/u with GI if symptoms persist.  Pain well controlled with occasional need for tylenol. Pathology showed adenoma, negative for carcinoma and margins negative.  Following up Dr. Pamela LARA.   Will need follow up imaging to evaluate remaining adenomas/FNHs.    6/29/21 Additional Clinical Information  Pathologic Diagnosis A.  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    RESECTION MARGIN IS NEGATIVE.   . NEGATIVE FOR CARCINOMA. [1]  5/20/21 MRI with Eovist. They see the liver has no fat or iron but with multiple lesions seen. Some of them have clear central scars with persistent enhancement favoring focal nodular hyperplasia such as in segment 8 given 8.2 x 6.8 cm lesion, segment 7 a 7.2 x 4.2 cm lesion and in segment two 2.1 x 1.6 cm lesion. Additional lesions have internal fat and washout on delayed imaging which favors hepatic adenomas this includes the dominant lesion in segment 2 measuring 8.9 x 8.2 cm as well as additional lesions in segment 4A which was 2.5 x 1.9 and in segment three 2.3 x 1.9 cm and in segment eight 2.4 x 1.3 cm and she 6 1.2x0.8cm.  Additional subcentimeter lesions seen throughout the liver. GB and biliary tree normal. Spleen normal and kidneys show small right renal cysts. Liver vessels patent.  Overall they felt you had a combination of focal nodular hyperplasias and adenomas.   April 8 labs:  Sodium 139 k 4.2 chloride 104 CO2 25 glucose 96 BUN of 18 creatinine 0.8 calcium 9.3 corrected calcium 9.4 albumin 3.9 bilirubin 0.6 alkaline phosphatase 70 AST 17 ALT of 18.  Liver enzymes are at ideal level.  Beta natruretic peptide was normal at 13.  Lipase was normal at 13.  Magnesium was normal at 2.1.  TSH 1.832.  White count 10.6 hemoglobin 12.7 plate count 341.  Neutrophils were 5.8.  MCV normal at 90.5.  INR normal 1.0.  Saw also echocardiogram dated April 5 shows an ejection fraction of 55 to 60% right ventricle normal in size and function.  CT chest April 15 review by radiology here suggested she have three-vessel aortic arch noted.  No significant atherosclerotic disease was seen.  No significant coronary artery calcification was seen.  Normal caliber to the main pulmonary artery was seen.  There was no central filling defect that was seen.  Heart size was normal with no pericardial effusion.  Did see a small hiatal hernia.  No pathologically enlarged lymph nodes were seen.  The airways were patent.  They did see a cluster of micronodules within the right lower lobe which they said could be infectious or inflammatory etiology of the recommended attention on follow-up.  I would recommend that you follow-up with your local doctor regarding that.  They did mention again that they saw the multiple liver lesions largest being in segment 7/8.  They also saw some fluid attenuating simple appearing renal cyst within the right kidney up to 1.5 x 1.5 cm.  MRI also read on April 15.  We mentioned that the liver had no fat or prior.  They did mention that intralesional T2 hyperintense stellate-like scars were seen in the hands and delayed phases and the larger lesions.  No evidence of intralesional fat was seen.  Several of the smaller lesions are visible on the diffusion sequences and/or the CT.  Examples I gave her in segment 8 of 1.7 x 7.2 cm, segment 7 had a 5.6 x 3.3 cm 1, in segment 1 these are 1.3 x 1.9 cm 1. An exophytic 6.5 x 7.9 cm lesion was seen in the gastrosplenic ligament adjacent to the left lobe and the greater curvature of the stomach.  This 1 also had the central stellate-like scar that was seen.  The CT much better demonstrates a left hepatic arterial branches coursing into this exophytic lesion proving its hepatic origin.  Lesion of similar intensity was seen in segment 5/6 measuring 2.3 x 3.6 cm as well. The biliary tree appeared to be normal, spleen was normal, pancreas was normal, kidney showed a renal cyst. They felt that these lesions in the liver are very suggestive of focal nodular hyperplasia.  The largest being an exophytic lesion arising from the left lobe measuring 7.9 cm.  The did recommend further evaluation with a Eovist for definitive diagnosis.  Spoke with pt: Explained need the mri with eovist to be done to better see the liver lesions.  Also mentioned to have primary MD review the chest ct nonspecific findings seen.  She stays liver lesions first noted 3/18 with hospital for epigastric/chest pain.  Went to [place] ER and CT showed a liver lesion.  MRI showed 2 lesions, one larger to center and to left of the liver 8 x 3 cm and several other smaller ones.    Normal cardiac work up.  She has hx of OCP use many  years ago (about 2 years total) and was on estradiol patch March 3.  On March 17, she developed pain and stopped  patch March 24.  Testosterone pellets x 1.5 years ago, last in Feb 2021.  Also hx of progesterone supplementation with one prior pregnancy.   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   Plan: 1. June 2022 mri to follow up the liver lesions. 2. She will follow up with local doctors re the breast lesion and the issues of the options on Jan 18 with local. 3. She will do labs again  over time. 4. She will let us know what plans are noted.  Stressed to pt the need for social distancing and strict handwashing and wearing a mask and to follow any other new or added CDC recommendations as this is an evolving target.  Duration of the visit was 36 minutes with 5 minutes of chart prep and 31 minutes of TeleMed visi today via [employer] with time spent reviewing recent records current status and future plans for the patient.

## 2022-06-04 ENCOUNTER — LAB OUTSIDE AN ENCOUNTER (OUTPATIENT)
Dept: URBAN - METROPOLITAN AREA TELEHEALTH 2 | Facility: TELEHEALTH | Age: 57
End: 2022-06-04

## 2022-08-09 ENCOUNTER — TELEPHONE ENCOUNTER (OUTPATIENT)
Dept: URBAN - METROPOLITAN AREA CLINIC 92 | Facility: CLINIC | Age: 57
End: 2022-08-09

## 2022-08-09 LAB
A/G RATIO: 1.8
ALBUMIN: 4.4
ALKALINE PHOSPHATASE: 90
ALT (SGPT): 19
AST (SGOT): 19
BASO (ABSOLUTE): 0.1
BASOS: 1
BILIRUBIN, TOTAL: 0.5
BUN/CREATININE RATIO: 17
BUN: 10
CALCIUM: 9.4
CARBON DIOXIDE, TOTAL: 22
CHLORIDE: 101
CREATININE: 0.59
EGFR: 106
EOS (ABSOLUTE): 0.1
EOS: 1
GLOBULIN, TOTAL: 2.4
GLUCOSE: 131
HEMATOCRIT: 37.6
HEMATOLOGY COMMENTS:: (no result)
HEMOGLOBIN: 12.5
IMMATURE CELLS: (no result)
IMMATURE GRANS (ABS): 0
IMMATURE GRANULOCYTES: 0
LYMPHS (ABSOLUTE): 3.2
LYMPHS: 36
MCH: 25.9
MCHC: 33.2
MCV: 78
MONOCYTES(ABSOLUTE): 0.5
MONOCYTES: 6
NEUTROPHILS (ABSOLUTE): 5
NEUTROPHILS: 56
NRBC: (no result)
PLATELETS: 310
POTASSIUM: 4.3
PROTEIN, TOTAL: 6.8
RBC: 4.83
RDW: 16.1
SODIUM: 140
WBC: 9

## 2022-08-09 NOTE — HPI-TODAY'S VISIT:
Dear Mary Ling, Aug 8 labs: White blood cell count 9 hemoglobin 12.5 which is down from 14.3 in December.  Platelet count 310 which is normal but slightly down from 360 before.  MCV down to 78 and was normal before.  You may be iron deficient.  Please check that.  MCH was slightly low at 25.9. Neutrophils normal at 5.0 and lymphocytes elevated at 3.2.  Sometimes she can see that with her recent viral illness. Have you been ill? Sugar elevated at 131 from previously 89 which was normal.  BUN of 10 Kren 0.59 sodium 140 potassium 4.3 albumin 4.4 bilirubin 0.5 alkaline phosphatase 90 AST 19 and ALT 19.  These are actually lower than previous AST 20 and ALT 29 December. In summary, hemoglobin a little lower, and MCV lower, will check your iron level locally and suspect you may be iron deficient. Liver labs however remained stable. Dr. Hernandez

## 2022-08-10 ENCOUNTER — WEB ENCOUNTER (OUTPATIENT)
Dept: URBAN - METROPOLITAN AREA CLINIC 86 | Facility: CLINIC | Age: 57
End: 2022-08-10

## 2022-08-13 ENCOUNTER — TELEPHONE ENCOUNTER (OUTPATIENT)
Dept: URBAN - METROPOLITAN AREA CLINIC 92 | Facility: CLINIC | Age: 57
End: 2022-08-13

## 2022-08-13 NOTE — HPI-TODAY'S VISIT:
Dear Mary Ling, August 11 MRI shows in the lower chest views, post right mastectomy expected postoperative changes were noted.  Left breast prosthesis was seen.  Trace bilateral pleural effusions were seen with some associated atelectasis.  Please share with primary provider. Liver showed no fat or iron and they do see changes of left lateral sector resection of segment 2/3 for that adenoma. They still see multiple R2 enhancing lesions somewhat with central scar with persistent has been delayed hepatobiliary phase imaging consistent with focal nodular hyperplasia including in segment 8 and 8.2 x 6.0 cm 1 that is felt unchanged.  Segment seven 4.9 x 3.6 cm minimally decreased in size.  Segment two 2.2 x 1.8  cm grossly unchanged.  Caudate lobe 2.0 x 1.6 cm and unchanged. Other lesions that are favored to be adenomas are seen in segment 4A measuring 2.6 x 1.9 cm unchanged.  Segment three 2.2 x 1.6 cm grossly unchanged. Segment eight 2.5 x 2.1 cm grossly unchanged. Segment six 4.2 x 2.7 cm also unchanged.  There are some other additional adenomas noted which were unchanged but no sizes given.  Gallbladder/biliary tree was normal. Spleen, pancreas, adrenal glands, and lymph nodes and vessels were normal. They see some multiple simple appearing cysts in the kidneys with no sizes given. They see some scattered vertebral body hemangiomas as well as a small fat-containing left ventral hernia. Overall they felt that you had numerous unchanged hepatic lesions consistent with focal nodular aplasia and hepatic adenomas and evidence of the resection of the segment 2/3 exophytic hepatic adenoma noted. We will review this at visit but plan a redo in 6m to follow the adenomas more so than the FNH lesions. Dr Hernandez

## 2022-08-14 ENCOUNTER — WEB ENCOUNTER (OUTPATIENT)
Dept: URBAN - METROPOLITAN AREA CLINIC 86 | Facility: CLINIC | Age: 57
End: 2022-08-14

## 2022-08-15 ENCOUNTER — WEB ENCOUNTER (OUTPATIENT)
Dept: URBAN - METROPOLITAN AREA CLINIC 86 | Facility: CLINIC | Age: 57
End: 2022-08-15

## 2022-08-26 ENCOUNTER — OFFICE VISIT (OUTPATIENT)
Dept: URBAN - METROPOLITAN AREA CLINIC 86 | Facility: CLINIC | Age: 57
End: 2022-08-26
Payer: COMMERCIAL

## 2022-08-26 ENCOUNTER — WEB ENCOUNTER (OUTPATIENT)
Dept: URBAN - METROPOLITAN AREA CLINIC 86 | Facility: CLINIC | Age: 57
End: 2022-08-26

## 2022-08-26 VITALS
SYSTOLIC BLOOD PRESSURE: 132 MMHG | HEART RATE: 56 BPM | HEIGHT: 64 IN | TEMPERATURE: 97.8 F | DIASTOLIC BLOOD PRESSURE: 78 MMHG | BODY MASS INDEX: 28.17 KG/M2 | WEIGHT: 165 LBS

## 2022-08-26 DIAGNOSIS — E03.9 HYPOTHYROIDISM, UNSPECIFIED TYPE: ICD-10-CM

## 2022-08-26 DIAGNOSIS — N95.1 PERIMENOPAUSAL: ICD-10-CM

## 2022-08-26 DIAGNOSIS — R10.84 ABDOMINAL CRAMPING, GENERALIZED: ICD-10-CM

## 2022-08-26 DIAGNOSIS — R53.83 FATIGUE: ICD-10-CM

## 2022-08-26 DIAGNOSIS — D13.4 HEPATIC ADENOMA: ICD-10-CM

## 2022-08-26 DIAGNOSIS — K76.89 NODULAR HYPERPLASIA OF LIVER: ICD-10-CM

## 2022-08-26 DIAGNOSIS — Z71.89 VACCINE COUNSELING: ICD-10-CM

## 2022-08-26 DIAGNOSIS — D05.01 LOBULAR CARCINOMA IN SITU OF RIGHT BREAST: ICD-10-CM

## 2022-08-26 DIAGNOSIS — R93.5 ABNORMAL MRI OF ABDOMEN: ICD-10-CM

## 2022-08-26 DIAGNOSIS — Z83.71 FAMILY HISTORY OF POLYPS IN THE COLON: ICD-10-CM

## 2022-08-26 DIAGNOSIS — Z98.890 HISTORY OF COLONOSCOPY: ICD-10-CM

## 2022-08-26 PROCEDURE — 99214 OFFICE O/P EST MOD 30 MIN: CPT

## 2022-08-26 RX ORDER — ESCITALOPRAM OXALATE 5 MG/1
1 TABLET TABLET, FILM COATED ORAL ONCE A DAY
Status: ACTIVE | COMMUNITY

## 2022-08-26 NOTE — EXAM-PHYSICAL EXAM
Gen: awake and responsive. Eyes: anicteric, normal lids.  Mouth: covered with mask. Nose: covered with mask. Hearing: intact grossly. Neck: trachea midline and no jvd. CV: Reg bradycardia no s3. Lungs: clear. No wheezes, Abd: Soft, nabs, nr, NT. No liver or spleen enlargement Ext: no sig edema, no sig palm erythema. Neuro: moves all 4 ext grossly. No asterixis. Skin: no pruritis and no sig palm erythema.

## 2022-08-26 NOTE — HPI-TODAY'S VISIT:
This is a scheduled follow-up appointment for this patient, a 57 year old female, after a previous office visit in Jan 2022 and initially referred by Dr. Sowmya Smith in [place] for an evaluation for multiple liver  lesions.   A copy of the note will be sent to the referring provider.   August 11 MRI shows in the lower chest views, post right mastectomy expected postoperative changes were noted.  Left breast prosthesis was seen.  Trace bilateral pleural effusions were seen with some associated atelectasis.  Please share with primary provider. Liver showed no fat or iron and they do see changes of left lateral sector resection of segment 2/3 for that adenoma. They still see multiple R2 enhancing lesions somewhat with central scar with persistent has been delayed hepatobiliary phase imaging consistent with focal nodular hyperplasia including in segment 8 and 8.2 x 6.0 cm 1 that is felt unchanged.  Segment seven 4.9 x 3.6 cm minimally decreased in size.  Segment two 2.2 x 1.8  cm grossly unchanged.  Caudate lobe 2.0 x 1.6 cm and unchanged. Other lesions that are favored to be adenomas are seen in segment 4A measuring 2.6 x 1.9 cm unchanged.  Segment three 2.2 x 1.6 cm grossly unchanged. Segment eight 2.5 x 2.1 cm grossly unchanged. Segment six 4.2 x 2.7 cm also unchanged.  There are some other additional adenomas noted which were unchanged but no sizes given.  Gallbladder/biliary tree was normal. Spleen, pancreas, adrenal glands, and lymph nodes and vessels were normal. They see some multiple simple appearing cysts in the kidneys with no sizes given. They see some scattered vertebral body hemangiomas as well as a small fat-containing left ventral hernia. Overall they felt that you had numerous unchanged hepatic lesions consistent with focal nodular aplasia and hepatic adenomas and evidence of the resection of the segment 2/3 exophytic hepatic adenoma noted. We will review this at visit but plan a redo in 6m to follow the adenomas more so than the FNH lesions.  Aug 8 labs: White blood cell count 9 hemoglobin 12.5 which is down from 14.3 in December.  Platelet count 310 which is normal but slightly down from 360 before.  MCV down to 78 and was normal before.  You may be iron deficient.  Please check that.  MCH was slightly low at 25.9. Neutrophils normal at 5.0 and lymphocytes elevated at 3.2.  Sometimes she can see that with her recent viral illness. Have you been ill? Sugar elevated at 131 from previously 89 which was normal.  BUN of 10 cr 0.59 sodium 140 potassium 4.3 albumin 4.4 bilirubin 0.5 alkaline phosphatase 90 AST 19 and ALT 19.  These are actually lower than previous AST 20 and ALT 29 December. In summary, hemoglobin a little lower, and MCV lower, will check your iron level locally and suspect you may be iron deficient.  She says she she is to do the iron levels.  Dec 28 mri read out today: Lower thorax where seen showed only trace bilateral pleural effusions. Liver showed no fat or iron.  They saw interval resection since last mri of the previously dominant exophytic hepatic adenoma in segment 2/3 without any findings of local recurrence.   They still see multiple arterially enhancing lesions consistent with simple scar consistent consistent with focal nodular hyperplasia.  Overall these lesions are similar in size and appearance to the May 2021 study with the largest being an 8.2 x 6.8 cm lesion in segment 8, 5.2 x 3.6 cm in segment 7, 2.1 x 1.6 cm in segment 2, and 2.2 x 1.4 cm in the caudate lobe. Some other additional arterial enhancing lesions with internal fat seen that were felt to be favoring hepatic adenomas also seen including in segment 4A a 2.5 x 1.9 cm lesion, a segment three 2.3 x 1.9 lesion, segment eight 2.5 x 2.1 cm lesion, and segment 6 of 4.2 x 2.9 cm lesion as well. Multiple additional smaller scattered hepatic adenomas seen in the right and left hepatic lobes. Gallbladder/biliary tree were normal, as were spleen, pancreas, adrenals and lymph nodes. Kidneys showed renal cysts but no hydronephrosis.   Another new finding was this 3.4 x 4.9 cm right nonenhancing fluid collection seen in the lateral right breast. Small fat-containing ventral hernia seen as well as scattered vertebral body hemangiomas. Overall they saw interval resection of the segment 2/3 large exophytic hepatic adenoma since the last scan and without local recurrence.  They aslo saw a combination of FNH and adenomas that persist.  They suspect that you have a hematoma noted in the lateral right breast soft tissues. We need to redo the mri in 6m. Called the pt and she had an excisional biopsy done of the breast area. 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 She says offered tamoxifen and she is worried re about its effects and she says she is exploring other options including more surgery for this.  She did March 2022 and she had infection and implant removed in may and going sept 16 for the implant.  November 15 labs show normal INR at 1.0 glucose 89 BUN of 10 creatinine 0.68 sodium 139 potassium 4.3 calcium 9.1 albumin 4.0 bilirubin 0.4 alkaline phosphatase 83 AST 20 and ALT 26.  These are in normal range with ideal ALT being slightly lower at 25 or less.White blood cell count 9.9 hemoglobin 12.3 platelet count 298 MCV 92 and neutrophils 6.1 lymphocytes 3.0. Overall these laboratories are all within lab normal range.  She was on hormones for 15 days for the patch and then stopped it and that was when she felt the chest diff and then did the bx.  she testosterone implants for 2 yrs.  Ms. Ling is s/p laparoscopic left lateral sectorectomy on 6/29/21 for a dominant exophytic 9.1 cm hepatic adenoma of left lateral lobe with Dr. Teodoro Santiago.  She is recovering well aside from persistent diarrhea/constipation, last BM 3 days ago- taking fiber.   Will f/u with GI if symptoms persist.  Pain well controlled with occasional need for tylenol. Pathology showed adenoma, negative for carcinoma and margins negative.  Following up Dr. Santiago PRN.   Will need follow up imaging to evaluate remaining adenomas/FNHs.    6/29/21 Additional Clinical Information  Pathologic Diagnosis A.  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    RESECTION MARGIN IS NEGATIVE.   . NEGATIVE FOR CARCINOMA. [1]  5/20/21 MRI with Eovist. They see the liver has no fat or iron but with multiple lesions seen. Some of them have clear central scars with persistent enhancement favoring focal nodular hyperplasia such as in segment 8 given 8.2 x 6.8 cm lesion, segment 7 a 7.2 x 4.2 cm lesion and in segment two 2.1 x 1.6 cm lesion. Additional lesions have internal fat and washout on delayed imaging which favors hepatic adenomas this includes the dominant lesion in segment 2 measuring 8.9 x 8.2 cm as well as additional lesions in segment 4A which was 2.5 x 1.9 and in segment three 2.3 x 1.9 cm and in segment eight 2.4 x 1.3 cm and she 6 1.2x0.8cm.  Additional subcentimeter lesions seen throughout the liver. GB and biliary tree normal. Spleen normal and kidneys show small right renal cysts. Liver vessels patent.  Overall they felt you had a combination of focal nodular hyperplasias and adenomas.   April 8 labs:  Sodium 139 k 4.2 chloride 104 CO2 25 glucose 96 BUN of 18 creatinine 0.8 calcium 9.3 corrected calcium 9.4 albumin 3.9 bilirubin 0.6 alkaline phosphatase 70 AST 17 ALT of 18.  Liver enzymes are at ideal level.  Beta natruretic peptide was normal at 13.  Lipase was normal at 13.  Magnesium was normal at 2.1.  TSH 1.832.  White count 10.6 hemoglobin 12.7 plate count 341.  Neutrophils were 5.8.  MCV normal at 90.5.  INR normal 1.0.  Saw also echocardiogram dated April 5 shows an ejection fraction of 55 to 60% right ventricle normal in size and function.  CT chest April 15 review by radiology here suggested she have three-vessel aortic arch noted.  No significant atherosclerotic disease was seen.  No significant coronary artery calcification was seen.  Normal caliber to the main pulmonary artery was seen.  There was no central filling defect that was seen.  Heart size was normal with no pericardial effusion.  Did see a small hiatal hernia.  No pathologically enlarged lymph nodes were seen.  The airways were patent.  They did see a cluster of micronodules within the right lower lobe which they said could be infectious or inflammatory etiology of the recommended attention on follow-up.  I would recommend that you follow-up with your local doctor regarding that.  They did mention again that they saw the multiple liver lesions largest being in segment 7/8.  They also saw some fluid attenuating simple appearing renal cyst within the right kidney up to 1.5 x 1.5 cm.  MRI also read on April 15.  We mentioned that the liver had no fat or prior.  They did mention that intralesional T2 hyperintense stellate-like scars were seen in the hands and delayed phases and the larger lesions.  No evidence of intralesional fat was seen.  Several of the smaller lesions are visible on the diffusion sequences and/or the CT.  Examples I gave her in segment 8 of 1.7 x 7.2 cm, segment 7 had a 5.6 x 3.3 cm 1, in segment 1 these are 1.3 x 1.9 cm 1. An exophytic 6.5 x 7.9 cm lesion was seen in the gastrosplenic ligament adjacent to the left lobe and the greater curvature of the stomach.  This 1 also had the central stellate-like scar that was seen.  The CT much better demonstrates a left hepatic arterial branches coursing into this exophytic lesion proving its hepatic origin.  Lesion of similar intensity was seen in segment 5/6 measuring 2.3 x 3.6 cm as well. The biliary tree appeared to be normal, spleen was normal, pancreas was normal, kidney showed a renal cyst. They felt that these lesions in the liver are very suggestive of focal nodular hyperplasia.  The largest being an exophytic lesion arising from the left lobe measuring 7.9 cm.  The did recommend further evaluation with a Eovist for definitive diagnosis.  Spoke with pt: Explained need the mri with eovist to be done to better see the liver lesions.  Also mentioned to have primary MD review the chest ct nonspecific findings seen.  She stays liver lesions first noted 3/18 with hospital for epigastric/chest pain.  Went to [place] ER and CT showed a liver lesion.  MRI showed 2 lesions, one larger to center and to left of the liver 8 x 3 cm and several other smaller ones.    Normal cardiac work up.  She has hx of OCP use many  years ago (about 2 years total) and was on estradiol patch March 3.  On March 17, she developed pain and stopped  patch March 24.  Testosterone pellets x 1.5 years ago, last in Feb 2021.  Also hx of progesterone supplementation with one prior pregnancy.   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   Plan: 1. Feb 2023 mri. 2. Do the labs the week pre. 3. See same day of the mri. 4. She carole lfollow up sugar and anemia issues.   Stressed to pt the need for social distancing and strict handwashing and wearing a mask and to follow any other new or added CDC recommendations as this is an evolving target.  Duration of the visit was 35  minutes with 10 minutes of chart prep and 25  minutes for the face to face visit with time spent reviewing recent records current status and future plans for the patient.

## 2022-08-30 ENCOUNTER — WEB ENCOUNTER (OUTPATIENT)
Dept: URBAN - METROPOLITAN AREA CLINIC 86 | Facility: CLINIC | Age: 57
End: 2022-08-30

## 2023-01-26 ENCOUNTER — WEB ENCOUNTER (OUTPATIENT)
Dept: URBAN - METROPOLITAN AREA CLINIC 86 | Facility: CLINIC | Age: 58
End: 2023-01-26

## 2023-01-27 ENCOUNTER — WEB ENCOUNTER (OUTPATIENT)
Dept: URBAN - METROPOLITAN AREA CLINIC 86 | Facility: CLINIC | Age: 58
End: 2023-01-27

## 2023-01-30 ENCOUNTER — WEB ENCOUNTER (OUTPATIENT)
Dept: URBAN - METROPOLITAN AREA CLINIC 86 | Facility: CLINIC | Age: 58
End: 2023-01-30

## 2023-02-01 ENCOUNTER — WEB ENCOUNTER (OUTPATIENT)
Dept: URBAN - METROPOLITAN AREA CLINIC 86 | Facility: CLINIC | Age: 58
End: 2023-02-01

## 2023-02-08 ENCOUNTER — WEB ENCOUNTER (OUTPATIENT)
Dept: URBAN - METROPOLITAN AREA CLINIC 86 | Facility: CLINIC | Age: 58
End: 2023-02-08

## 2023-02-09 ENCOUNTER — TELEPHONE ENCOUNTER (OUTPATIENT)
Dept: URBAN - METROPOLITAN AREA CLINIC 92 | Facility: CLINIC | Age: 58
End: 2023-02-09

## 2023-02-09 LAB
A/G RATIO: 2
ALBUMIN: 4.5
ALKALINE PHOSPHATASE: 91
ALT (SGPT): 18
AST (SGOT): 16
BASO (ABSOLUTE): 0.1
BASOS: 1
BILIRUBIN, TOTAL: 0.8
BUN/CREATININE RATIO: 19
BUN: 13
CALCIUM: 9.6
CARBON DIOXIDE, TOTAL: 24
CHLORIDE: 102
CREATININE: 0.69
EGFR: 102
EOS (ABSOLUTE): 0.1
EOS: 1
GLOBULIN, TOTAL: 2.2
GLUCOSE: 91
HEMATOCRIT: 39.2
HEMATOLOGY COMMENTS:: (no result)
HEMOGLOBIN: 13.1
IMMATURE CELLS: (no result)
IMMATURE GRANS (ABS): 0
IMMATURE GRANULOCYTES: 0
LYMPHS (ABSOLUTE): 3.2
LYMPHS: 34
MCH: 27.8
MCHC: 33.4
MCV: 83
MONOCYTES(ABSOLUTE): 0.6
MONOCYTES: 6
NEUTROPHILS (ABSOLUTE): 5.3
NEUTROPHILS: 58
NRBC: (no result)
PLATELETS: 321
POTASSIUM: 4.6
PROTEIN, TOTAL: 6.7
RBC: 4.72
RDW: 13.7
SODIUM: 141
WBC: 9.3

## 2023-02-09 NOTE — HPI-TODAY'S VISIT:
Dear Mary Ling, February 8 labs show glucose 91 BUN of 13 creatinine 0.69 sodium 141 potassium 4.6 chloride 102 CO2 24 calcium 9.6 albumin 4.5 bilirubin 0.8 alkaline phosphatase 91 AST 16 and ALT of 18 and previously AST 19 and ALT 19 so those are doing actually little lower.  Ideal ALT less than 25. White blood cell count 9.3 hemoglobin 13.1 platelet count 321 and MCV normal now at 83, previously low 78. Neutrophils 5.3 and lymphocytes remain elevated at 3.2 and previously 3.04. Look forward to seeing her to follow-up. Dr. Hernandez

## 2023-02-13 ENCOUNTER — TELEPHONE ENCOUNTER (OUTPATIENT)
Dept: URBAN - METROPOLITAN AREA CLINIC 92 | Facility: CLINIC | Age: 58
End: 2023-02-13

## 2023-02-13 ENCOUNTER — WEB ENCOUNTER (OUTPATIENT)
Dept: URBAN - METROPOLITAN AREA CLINIC 86 | Facility: CLINIC | Age: 58
End: 2023-02-13

## 2023-02-13 ENCOUNTER — LAB OUTSIDE AN ENCOUNTER (OUTPATIENT)
Dept: URBAN - METROPOLITAN AREA CLINIC 86 | Facility: CLINIC | Age: 58
End: 2023-02-13

## 2023-02-13 ENCOUNTER — OFFICE VISIT (OUTPATIENT)
Dept: URBAN - METROPOLITAN AREA CLINIC 86 | Facility: CLINIC | Age: 58
End: 2023-02-13

## 2023-02-13 ENCOUNTER — OFFICE VISIT (OUTPATIENT)
Dept: URBAN - METROPOLITAN AREA CLINIC 86 | Facility: CLINIC | Age: 58
End: 2023-02-13
Payer: COMMERCIAL

## 2023-02-13 VITALS
WEIGHT: 164 LBS | BODY MASS INDEX: 28 KG/M2 | HEIGHT: 64 IN | DIASTOLIC BLOOD PRESSURE: 78 MMHG | SYSTOLIC BLOOD PRESSURE: 121 MMHG | TEMPERATURE: 97.5 F | HEART RATE: 77 BPM

## 2023-02-13 DIAGNOSIS — D05.01 LOBULAR CARCINOMA IN SITU OF RIGHT BREAST: ICD-10-CM

## 2023-02-13 DIAGNOSIS — D13.4 HEPATIC ADENOMA: ICD-10-CM

## 2023-02-13 DIAGNOSIS — K76.89 NODULAR HYPERPLASIA OF LIVER: ICD-10-CM

## 2023-02-13 DIAGNOSIS — R93.5 ABNORMAL MRI OF ABDOMEN: ICD-10-CM

## 2023-02-13 PROBLEM — 424263008: Status: ACTIVE | Noted: 2021-08-09

## 2023-02-13 PROBLEM — 84229001 FATIGUE: Status: ACTIVE | Noted: 2021-04-12

## 2023-02-13 PROBLEM — 429969003 FAMILY HISTORY OF POLYP OF COLON: Status: ACTIVE | Noted: 2021-04-12

## 2023-02-13 PROBLEM — 354471000119108 LOBULAR CARCINOMA IN SITU OF RIGHT BREAST: Status: ACTIVE | Noted: 2022-01-03

## 2023-02-13 PROBLEM — 409063005 COUNSELING: Status: ACTIVE | Noted: 2021-04-12

## 2023-02-13 PROBLEM — 161646004 H/O: HIGH RISK MEDICATION: Status: ACTIVE | Noted: 2021-04-12

## 2023-02-13 PROBLEM — 441988000 IMAGING OF ABDOMEN ABNORMAL: Status: ACTIVE | Noted: 2021-04-12

## 2023-02-13 PROBLEM — 851000119109 HISTORY OF COLONOSCOPY: Status: ACTIVE | Noted: 2021-04-12

## 2023-02-13 PROBLEM — 300331000 LESION OF LIVER: Status: ACTIVE | Noted: 2021-04-12

## 2023-02-13 PROBLEM — 40930008: Status: ACTIVE | Noted: 2021-04-12

## 2023-02-13 PROBLEM — 21522001 ABDOMINAL PAIN: Status: ACTIVE | Noted: 2021-04-12

## 2023-02-13 PROBLEM — 109820009 NODULAR HYPERPLASIA OF LIVER: Status: ACTIVE | Noted: 2022-01-03

## 2023-02-13 PROBLEM — 161541000119104 PERIMENOPAUSAL: Status: ACTIVE | Noted: 2021-04-12

## 2023-02-13 PROCEDURE — 99215 OFFICE O/P EST HI 40 MIN: CPT

## 2023-02-13 RX ORDER — ESCITALOPRAM OXALATE 5 MG/1
1 TABLET TABLET, FILM COATED ORAL ONCE A DAY
Status: ACTIVE | COMMUNITY

## 2023-02-13 NOTE — HPI-TODAY'S VISIT:
This is a scheduled follow-up appointment for this patient, a 58 year old female, after a previous office visit in  [facility] 2022 and initially referred by Dr. Sowmya Smith in [place] for an evaluation for multiple liver  lesions.   A copy of the note will be sent to the referring provider.   February 8 labs show glucose 91 BUN of 13 creatinine 0.69 sodium 141 potassium 4.6 chloride 102 CO2 24 calcium 9.6 albumin 4.5 bilirubin 0.8 alkaline phosphatase 91 AST 16 and ALT of 18 and previously AST 19 and ALT 19 so those are doing actually little lower.  Ideal ALT less than 25. White blood cell count 9.3 hemoglobin 13.1 platelet count 321 and MCV normal now at 83, previously low 78. Neutrophils 5.3 and lymphocytes remain elevated at 3.2 and previously 3.04.  She did mri but we will let her know when get it.  August 11 MRI shows in the lower chest views, post right mastectomy expected postoperative changes were noted.  Left breast prosthesis was seen.  Trace bilateral pleural effusions were seen with some associated atelectasis.  Please share with primary provider. Liver showed no fat or iron and they do see changes of left lateral sector resection of segment 2/3 for that adenoma. They still see multiple R2 enhancing lesions somewhat with central scar with persistent has been delayed hepatobiliary phase imaging consistent with focal nodular hyperplasia including in segment 8 and 8.2 x 6.0 cm 1 that is felt unchanged.  Segment seven 4.9 x 3.6 cm minimally decreased in size.  Segment two 2.2 x 1.8  cm grossly unchanged.  Caudate lobe 2.0 x 1.6 cm and unchanged. Other lesions that are favored to be adenomas are seen in segment 4A measuring 2.6 x 1.9 cm unchanged.  Segment three 2.2 x 1.6 cm grossly unchanged. Segment eight 2.5 x 2.1 cm grossly unchanged. Segment six 4.2 x 2.7 cm also unchanged.  There are some other additional adenomas noted which were unchanged but no sizes given.  Gallbladder/biliary tree was normal. Spleen, pancreas, adrenal glands, and lymph nodes and vessels were normal. They see some multiple simple appearing cysts in the kidneys with no sizes given. They see some scattered vertebral body hemangiomas as well as a small fat-containing left ventral hernia. Overall they felt that you had numerous unchanged hepatic lesions consistent with focal nodular aplasia and hepatic adenomas and evidence of the resection of the segment 2/3 exophytic hepatic adenoma noted. We will review this at visit but plan a redo in 6m to follow the adenomas more so than the FNH lesions.  Aug 8 labs: White blood cell count 9 hemoglobin 12.5 which is down from 14.3 in December.  Platelet count 310 which is normal but slightly down from 360 before.  MCV down to 78 and was normal before.  You may be iron deficient.  Please check that.  MCH was slightly low at 25.9. Neutrophils normal at 5.0 and lymphocytes elevated at 3.2.  Sometimes she can see that with her recent viral illness. Have you been ill? Sugar elevated at 131 from previously 89 which was normal.  BUN of 10 cr 0.59 sodium 140 potassium 4.3 albumin 4.4 bilirubin 0.5 alkaline phosphatase 90 AST 19 and ALT 19.  These are actually lower than previous AST 20 and ALT 29 December. In summary, hemoglobin a little lower, and MCV lower, will check your iron level locally and suspect you may be iron deficient.  She says she she is to do the iron levels.  Dec 28 mri  Lower thorax where seen showed only trace bilateral pleural effusions. Liver showed no fat or iron.  They saw interval resection since last mri of the previously dominant exophytic hepatic adenoma in segment 2/3 without any findings of local recurrence.   They still see multiple arterially enhancing lesions consistent with simple scar consistent consistent with focal nodular hyperplasia.  Overall these lesions are similar in size and appearance to the May 2021 study with the largest being an 8.2 x 6.8 cm lesion in segment 8, 5.2 x 3.6 cm in segment 7, 2.1 x 1.6 cm in segment 2, and 2.2 x 1.4 cm in the caudate lobe. Some other additional arterial enhancing lesions with internal fat seen that were felt to be favoring hepatic adenomas also seen including in segment 4A a 2.5 x 1.9 cm lesion, a segment three 2.3 x 1.9 lesion, segment eight 2.5 x 2.1 cm lesion, and segment 6 of 4.2 x 2.9 cm lesion as well. Multiple additional smaller scattered hepatic adenomas seen in the right and left hepatic lobes. Gallbladder/biliary tree were normal, as were spleen, pancreas, adrenals and lymph nodes. Kidneys showed renal cysts but no hydronephrosis.   Another new finding was this 3.4 x 4.9 cm right nonenhancing fluid collection seen in the lateral right breast. Small fat-containing ventral hernia seen as well as scattered vertebral body hemangiomas. Overall they saw interval resection of the segment 2/3 large exophytic hepatic adenoma since the last scan and without local recurrence.  They aslo saw a combination of FNH and adenomas that persist.  They suspect that you have a hematoma noted in the lateral right breast soft tissues. We need to redo the mri in 6m. Called the pt and she had an excisional biopsy done of the breast area. 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 She says offered tamoxifen and she is worried re about its effects and she says she is exploring other options including more surgery for this.  She did March 2022 and she had infection and implant removed in may and going sept 16 for the implant.  November 15 labs show normal INR at 1.0 glucose 89 BUN of 10 creatinine 0.68 sodium 139 potassium 4.3 calcium 9.1 albumin 4.0 bilirubin 0.4 alkaline phosphatase 83 AST 20 and ALT 26.  These are in normal range with ideal ALT being slightly lower at 25 or less.White blood cell count 9.9 hemoglobin 12.3 platelet count 298 MCV 92 and neutrophils 6.1 lymphocytes 3.0. Overall these laboratories are all within lab normal range.  She was on hormones for 15 days for the patch and then stopped it and that was when she felt the chest diff and then did the bx.  she testosterone implants for 2 yrs.  Ms. Ling is s/p laparoscopic left lateral sectorectomy on 6/29/21 for a dominant exophytic 9.1 cm hepatic adenoma of left lateral lobe with Dr. Teodoro Santiago.  She is recovering well aside from persistent diarrhea/constipation, last BM 3 days ago- taking fiber.   Will f/u with GI if symptoms persist.  Pain well controlled with occasional need for tylenol. Pathology showed adenoma, negative for carcinoma and margins negative.  Following up Dr. Santiago PRN.   Will need follow up imaging to evaluate remaining adenomas/FNHs.    6/29/21 Additional Clinical Information  Pathologic Diagnosis A.  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    RESECTION MARGIN IS NEGATIVE.   . NEGATIVE FOR CARCINOMA. [1]  5/20/21 MRI with Eovist. They see the liver has no fat or iron but with multiple lesions seen. Some of them have clear central scars with persistent enhancement favoring focal nodular hyperplasia such as in segment 8 given 8.2 x 6.8 cm lesion, segment 7 a 7.2 x 4.2 cm lesion and in segment two 2.1 x 1.6 cm lesion. Additional lesions have internal fat and washout on delayed imaging which favors hepatic adenomas this includes the dominant lesion in segment 2 measuring 8.9 x 8.2 cm as well as additional lesions in segment 4A which was 2.5 x 1.9 and in segment three 2.3 x 1.9 cm and in segment eight 2.4 x 1.3 cm and she 6 1.2x0.8cm.  Additional subcentimeter lesions seen throughout the liver. GB and biliary tree normal. Spleen normal and kidneys show small right renal cysts. Liver vessels patent.  Overall they felt you had a combination of focal nodular hyperplasias and adenomas.   April 8 labs:  Sodium 139 k 4.2 chloride 104 CO2 25 glucose 96 BUN of 18 creatinine 0.8 calcium 9.3 corrected calcium 9.4 albumin 3.9 bilirubin 0.6 alkaline phosphatase 70 AST 17 ALT of 18.  Liver enzymes are at ideal level.  Beta natruretic peptide was normal at 13.  Lipase was normal at 13.  Magnesium was normal at 2.1.  TSH 1.832.  White count 10.6 hemoglobin 12.7 plate count 341.  Neutrophils were 5.8.  MCV normal at 90.5.  INR normal 1.0.  Saw also echocardiogram dated April 5 shows an ejection fraction of 55 to 60% right ventricle normal in size and function.  CT chest April 15 review by radiology here suggested she have three-vessel aortic arch noted.  No significant atherosclerotic disease was seen.  No significant coronary artery calcification was seen.  Normal caliber to the main pulmonary artery was seen.  There was no central filling defect that was seen.  Heart size was normal with no pericardial effusion.  Did see a small hiatal hernia.  No pathologically enlarged lymph nodes were seen.  The airways were patent.  They did see a cluster of micronodules within the right lower lobe which they said could be infectious or inflammatory etiology of the recommended attention on follow-up.  I would recommend that you follow-up with your local doctor regarding that.  They did mention again that they saw the multiple liver lesions largest being in segment 7/8.  They also saw some fluid attenuating simple appearing renal cyst within the right kidney up to 1.5 x 1.5 cm.  MRI also read on April 15.  We mentioned that the liver had no fat or prior.  They did mention that intralesional T2 hyperintense stellate-like scars were seen in the hands and delayed phases and the larger lesions.  No evidence of intralesional fat was seen.  Several of the smaller lesions are visible on the diffusion sequences and/or the CT.  Examples I gave her in segment 8 of 1.7 x 7.2 cm, segment 7 had a 5.6 x 3.3 cm 1, in segment 1 these are 1.3 x 1.9 cm 1. An exophytic 6.5 x 7.9 cm lesion was seen in the gastrosplenic ligament adjacent to the left lobe and the greater curvature of the stomach.  This 1 also had the central stellate-like scar that was seen.  The CT much better demonstrates a left hepatic arterial branches coursing into this exophytic lesion proving its hepatic origin.  Lesion of similar intensity was seen in segment 5/6 measuring 2.3 x 3.6 cm as well. The biliary tree appeared to be normal, spleen was normal, pancreas was normal, kidney showed a renal cyst. They felt that these lesions in the liver are very suggestive of focal nodular hyperplasia.  The largest being an exophytic lesion arising from the left lobe measuring 7.9 cm.  The did recommend further evaluation with a Eovist for definitive diagnosis.  Spoke with pt: Explained need the mri with eovist to be done to better see the liver lesions.  Also mentioned to have primary MD review the chest ct nonspecific findings seen.  She stays liver lesions first noted 3/18 with hospital for epigastric/chest pain.  Went to [place] ER and CT showed a liver lesion.  MRI showed 2 lesions, one larger to center and to left of the liver 8 x 3 cm and several other smaller ones.    Normal cardiac work up.  She has hx of OCP use many  years ago (about 2 years total) and was on estradiol patch March 3.  On March 17, she developed pain and stopped  patch March 24.  Testosterone pellets x 1.5 years ago, last in Feb 2021.  Also hx of progesterone supplementation with one prior pregnancy.   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   Plan: 1. Feb 2023 mri that she did today and plan 6m. 2. Plan maybe if possible post the next scan to do every other time mri. 3. Do the labs the week pre next visit.   Stressed to pt the need for social distancing and strict handwashing and wearing a mask and to follow any other new or added CDC recommendations as this is an evolving target.  Duration of the visit was 45 minutes with 10 minutes of chart prep and 35  minutes for the face to face visit with time spent reviewing recent records current status and future plans for the patient.

## 2023-02-13 NOTE — HPI-TODAY'S VISIT:
Quinten Ling, February 13 final  MRI shows lower thorax with bilateral mastectomies and breast augmentation.  Trace pleural effusions seen. Liver showed no fat or iron but did have close surgical changes of the left lateral segment resection of the prior adenoma. They redemonstrated multiple arterial lesions some of which are central scar persist enhancement on delayed hepatobiliary phase compatible with focal nodular plasia such as segment 8 having an 8.0 x 6.2 lesion that was previously 8.2 x 6.0 cm. Segment 7 lesion measuring 5.5 x 3.5 previously 4.9 x 3.6 cm. Segment 2 lesion measuring 2.5 x 1.8 cm and previously 2.2 x 1.8 cm. Segment 1 lesion 2.7 x 2.5 previously 2.0 x 1.6 cm. Additional arterial enhancing lesions show microscopic fat-containing lesions with lack of Eovist uptake favor adenomas include segment 4A 2.9 x 2.1 lesion previously 2.6 x 1.9 cm. Segment 3 lesion measuring 1.9 x 1.5 previously 2.2 x 1.7 cm. Segment 8 lesion measuring 2.6 x 2.1 cm previously 2.5 x 2.1 cm. Segment 6 lesion measuring 4.5 x 2.7 cm and previously 4.2 x 2.7 cm.   Additional other scattered hepatic adenoma seen and there was felt similar. Gallbladder/biliary tree, spleen, pancreas, adrenal glands, kidneys, lymph nodes normal. Mild multilevel degenerative changes seen in the spine. Overall they felt that you had numerous unchanged to slightly increase size of multiple FNH or hepatic adenomas and no definitive new or suspicious liver lesions. Would recommend mri in 6m to follow the lesions. Dr Hernandez

## 2023-02-13 NOTE — EXAM-PHYSICAL EXAM
Gen: awake and responsive. Eyes: anicteric, normal lids. Mouth: covered with mask. Nose: covered with mask. Hearing: intact grossly. Neck: trachea midline and no jvd. CV: RRR no s3. Lungs: clear. No wheezes, Abd: Soft, nabs, NT. No  liver or spleen enlargement. Ext: no sig leg edema, some palm erythema. Neuro: moves all 4 ext grossly. No asterixis. Skin: no pruritis and some palm erythema.

## 2023-02-14 ENCOUNTER — WEB ENCOUNTER (OUTPATIENT)
Dept: URBAN - METROPOLITAN AREA CLINIC 86 | Facility: CLINIC | Age: 58
End: 2023-02-14

## 2023-02-15 ENCOUNTER — WEB ENCOUNTER (OUTPATIENT)
Dept: URBAN - METROPOLITAN AREA CLINIC 86 | Facility: CLINIC | Age: 58
End: 2023-02-15

## 2023-03-06 ENCOUNTER — OFFICE VISIT (OUTPATIENT)
Dept: URBAN - METROPOLITAN AREA CLINIC 86 | Facility: CLINIC | Age: 58
End: 2023-03-06

## 2023-03-16 ENCOUNTER — APPOINTMENT (OUTPATIENT)
Dept: URBAN - METROPOLITAN AREA CLINIC 210 | Age: 58
Setting detail: DERMATOLOGY
End: 2023-03-16

## 2023-03-16 DIAGNOSIS — D18.0 HEMANGIOMA: ICD-10-CM

## 2023-03-16 DIAGNOSIS — Z12.83 ENCOUNTER FOR SCREENING FOR MALIGNANT NEOPLASM OF SKIN: ICD-10-CM

## 2023-03-16 DIAGNOSIS — D22 MELANOCYTIC NEVI: ICD-10-CM

## 2023-03-16 DIAGNOSIS — L82.1 OTHER SEBORRHEIC KERATOSIS: ICD-10-CM

## 2023-03-16 PROBLEM — D18.01 HEMANGIOMA OF SKIN AND SUBCUTANEOUS TISSUE: Status: ACTIVE | Noted: 2023-03-16

## 2023-03-16 PROBLEM — D22.5 MELANOCYTIC NEVI OF TRUNK: Status: ACTIVE | Noted: 2023-03-16

## 2023-03-16 PROBLEM — D23.71 OTHER BENIGN NEOPLASM OF SKIN OF RIGHT LOWER LIMB, INCLUDING HIP: Status: ACTIVE | Noted: 2023-03-16

## 2023-03-16 PROCEDURE — 99213 OFFICE O/P EST LOW 20 MIN: CPT

## 2023-03-16 PROCEDURE — OTHER COUNSELING: OTHER

## 2023-03-16 PROCEDURE — OTHER MIPS QUALITY: OTHER

## 2023-03-16 ASSESSMENT — LOCATION DETAILED DESCRIPTION DERM
LOCATION DETAILED: LEFT FOREHEAD
LOCATION DETAILED: EPIGASTRIC SKIN
LOCATION DETAILED: LEFT DISTAL POSTERIOR THIGH
LOCATION DETAILED: RIGHT SUPERIOR MEDIAL LOWER BACK
LOCATION DETAILED: RIGHT DISTAL POSTERIOR THIGH
LOCATION DETAILED: LEFT ANTERIOR DISTAL THIGH
LOCATION DETAILED: LEFT INFERIOR UPPER BACK
LOCATION DETAILED: RIGHT ANTERIOR DISTAL THIGH
LOCATION DETAILED: RIGHT DISTAL POSTERIOR UPPER ARM
LOCATION DETAILED: LEFT MEDIAL SUPERIOR CHEST
LOCATION DETAILED: LEFT DISTAL POSTERIOR UPPER ARM
LOCATION DETAILED: SUPERIOR THORACIC SPINE

## 2023-03-16 ASSESSMENT — LOCATION ZONE DERM
LOCATION ZONE: LEG
LOCATION ZONE: ARM
LOCATION ZONE: FACE
LOCATION ZONE: TRUNK

## 2023-03-16 ASSESSMENT — PAIN INTENSITY VAS: HOW INTENSE IS YOUR PAIN 0 BEING NO PAIN, 10 BEING THE MOST SEVERE PAIN POSSIBLE?: NO PAIN

## 2023-03-16 ASSESSMENT — LOCATION SIMPLE DESCRIPTION DERM
LOCATION SIMPLE: RIGHT THIGH
LOCATION SIMPLE: LEFT FOREHEAD
LOCATION SIMPLE: LEFT UPPER BACK
LOCATION SIMPLE: LEFT POSTERIOR THIGH
LOCATION SIMPLE: LEFT THIGH
LOCATION SIMPLE: LEFT POSTERIOR UPPER ARM
LOCATION SIMPLE: UPPER BACK
LOCATION SIMPLE: RIGHT LOWER BACK
LOCATION SIMPLE: CHEST
LOCATION SIMPLE: ABDOMEN
LOCATION SIMPLE: RIGHT POSTERIOR UPPER ARM
LOCATION SIMPLE: RIGHT POSTERIOR THIGH

## 2023-03-30 ENCOUNTER — WEB ENCOUNTER (OUTPATIENT)
Dept: URBAN - METROPOLITAN AREA CLINIC 86 | Facility: CLINIC | Age: 58
End: 2023-03-30

## 2023-03-30 ENCOUNTER — TELEPHONE ENCOUNTER (OUTPATIENT)
Dept: URBAN - METROPOLITAN AREA CLINIC 63 | Facility: CLINIC | Age: 58
End: 2023-03-30

## 2023-06-14 ENCOUNTER — WEB ENCOUNTER (OUTPATIENT)
Dept: URBAN - METROPOLITAN AREA CLINIC 86 | Facility: CLINIC | Age: 58
End: 2023-06-14

## 2023-08-13 ENCOUNTER — LAB OUTSIDE AN ENCOUNTER (OUTPATIENT)
Dept: URBAN - METROPOLITAN AREA CLINIC 86 | Facility: CLINIC | Age: 58
End: 2023-08-13

## 2023-08-16 ENCOUNTER — WEB ENCOUNTER (OUTPATIENT)
Dept: URBAN - METROPOLITAN AREA CLINIC 86 | Facility: CLINIC | Age: 58
End: 2023-08-16

## 2023-08-29 ENCOUNTER — TELEPHONE ENCOUNTER (OUTPATIENT)
Dept: URBAN - METROPOLITAN AREA CLINIC 86 | Facility: CLINIC | Age: 58
End: 2023-08-29

## 2023-08-29 LAB
A/G RATIO: 1.9
ALBUMIN: 4.5
ALKALINE PHOSPHATASE: 90
ALT (SGPT): 21
AST (SGOT): 16
BASO (ABSOLUTE): 0.1
BASOS: 1
BILIRUBIN, TOTAL: 0.6
BUN/CREATININE RATIO: 23
BUN: 14
CALCIUM: 9.5
CARBON DIOXIDE, TOTAL: 23
CHLORIDE: 105
CREATININE: 0.61
EGFR: 105
EOS (ABSOLUTE): 0.1
EOS: 2
GLOBULIN, TOTAL: 2.4
GLUCOSE: 99
HEMATOCRIT: 41
HEMATOLOGY COMMENTS:: (no result)
HEMOGLOBIN: 13.6
IMMATURE CELLS: (no result)
IMMATURE GRANS (ABS): 0
IMMATURE GRANULOCYTES: 0
LYMPHS (ABSOLUTE): 2.8
LYMPHS: 32
MCH: 27.8
MCHC: 33.2
MCV: 84
MONOCYTES(ABSOLUTE): 0.5
MONOCYTES: 6
NEUTROPHILS (ABSOLUTE): 5.1
NEUTROPHILS: 59
NRBC: (no result)
PLATELETS: 296
POTASSIUM: 5.1
PROTEIN, TOTAL: 6.9
RBC: 4.89
RDW: 13.3
SODIUM: 141
WBC: 8.6

## 2023-08-29 NOTE — HPI-TODAY'S VISIT:
Dear Mary Ling, Aug 28 labs show glucose 99, BUN of 14, creatinine 0.61 sodium 141 potassium 5.1 calcium 9.5 albumin 4.5 bilirubin 0.6 alk phos 90 AST 16 ALT 21 with ideal ALT less than 25. WBC 8.6, hemoglobin 13.6 platelet count 296, MCV 84 and these are all normal which is good to see.  Neutrophils 5.1 and lymphocytes 2.8 and these are normal as well. Dr. Hernandez

## 2023-09-11 ENCOUNTER — WEB ENCOUNTER (OUTPATIENT)
Dept: URBAN - METROPOLITAN AREA CLINIC 86 | Facility: CLINIC | Age: 58
End: 2023-09-11

## 2023-09-11 ENCOUNTER — DASHBOARD ENCOUNTERS (OUTPATIENT)
Age: 58
End: 2023-09-11

## 2023-09-11 ENCOUNTER — OFFICE VISIT (OUTPATIENT)
Dept: URBAN - METROPOLITAN AREA CLINIC 86 | Facility: CLINIC | Age: 58
End: 2023-09-11
Payer: COMMERCIAL

## 2023-09-11 ENCOUNTER — TELEPHONE ENCOUNTER (OUTPATIENT)
Dept: URBAN - METROPOLITAN AREA CLINIC 86 | Facility: CLINIC | Age: 58
End: 2023-09-11

## 2023-09-11 VITALS
BODY MASS INDEX: 28.68 KG/M2 | SYSTOLIC BLOOD PRESSURE: 136 MMHG | DIASTOLIC BLOOD PRESSURE: 72 MMHG | HEIGHT: 64 IN | TEMPERATURE: 97.3 F | WEIGHT: 168 LBS | HEART RATE: 75 BPM

## 2023-09-11 DIAGNOSIS — D13.4 HEPATIC ADENOMA: ICD-10-CM

## 2023-09-11 DIAGNOSIS — K76.89 NODULAR HYPERPLASIA OF LIVER: ICD-10-CM

## 2023-09-11 DIAGNOSIS — Z79.899 HIGH RISK MEDICATION USE: ICD-10-CM

## 2023-09-11 DIAGNOSIS — R93.5 ABNORMAL MRI OF ABDOMEN: ICD-10-CM

## 2023-09-11 PROCEDURE — 99214 OFFICE O/P EST MOD 30 MIN: CPT

## 2023-09-11 RX ORDER — ESCITALOPRAM OXALATE 5 MG/1
1 TABLET TABLET, FILM COATED ORAL ONCE A DAY
Status: ACTIVE | COMMUNITY

## 2023-09-11 NOTE — EXAM-PHYSICAL EXAM
Gen: awake and responsive. Eyes: anicteric, normal lids. Mouth:  normal lips. Nose: normal drainage. Hearing: intact grossly. Neck: trachea midline and no jvd. CV: RRR no s3. Lungs: clear. No wheezes, Abd: Soft, nabs, NT. No  liver or spleen enlargement. Ext: no sig leg edema, some palm erythema. Neuro: moves all 4 ext grossly. No asterixis. Skin: no pruritis and some palm erythema.

## 2023-09-11 NOTE — HPI-TODAY'S VISIT:
Pt is  patient, a 58 year old female, after a previous office visit in  Feb 2023 and initially referred by Dr. Sowmya Smith in [place] for an evaluation for multiple liver  lesions.   A copy of the note will be sent to the referring provider.   Aug 28 labs show glucose 99, BUN of 14, creatinine 0.61 sodium 141 potassium 5.1 calcium 9.5 albumin 4.5 bilirubin 0.6 alk phos 90 AST 16 ALT 21 with ideal ALT less than 25. WBC 8.6, hemoglobin 13.6 platelet count 296, MCV 84 and these are all normal which is good to see.  Neutrophils 5.1 and lymphocytes 2.8 and these are normal as well.  Did the mri and pending.  February 13 final  MRI shows lower thorax with bilateral mastectomies and breast augmentation.  Trace pleural effusions seen. Liver showed no fat or iron but did have close surgical changes of the left lateral segment resection of the prior adenoma. They redemonstrated multiple arterial lesions some of which are central scar persist enhancement on delayed hepatobiliary phase compatible with focal nodular plasia such as segment 8 having an 8.0 x 6.2 lesion that was previously 8.2 x 6.0 cm. Segment 7 lesion measuring 5.5 x 3.5 previously 4.9 x 3.6 cm. Segment 2 lesion measuring 2.5 x 1.8 cm and previously 2.2 x 1.8 cm. Segment 1 lesion 2.7 x 2.5 previously 2.0 x 1.6 cm. Additional arterial enhancing lesions show microscopic fat-containing lesions with lack of Eovist uptake favor adenomas include segment 4A 2.9 x 2.1 lesion previously 2.6 x 1.9 cm. Segment 3 lesion measuring 1.9 x 1.5 previously 2.2 x 1.7 cm. Segment 8 lesion measuring 2.6 x 2.1 cm previously 2.5 x 2.1 cm. Segment 6 lesion measuring 4.5 x 2.7 cm and previously 4.2 x 2.7 cm.   Additional other scattered hepatic adenoma seen and there was felt similar. Gallbladder/biliary tree, spleen, pancreas, adrenal glands, kidneys, lymph nodes normal. Mild multilevel degenerative changes seen in the spine. Overall they felt that you had numerous unchanged to slightly increase size of multiple FNH or hepatic adenomas and no definitive new or suspicious liver lesions. Would recommend mri in 6m to follow the lesions.   February 8 labs show glucose 91 BUN of 13 creatinine 0.69 sodium 141 potassium 4.6 chloride 102 CO2 24 calcium 9.6 albumin 4.5 bilirubin 0.8 alkaline phosphatase 91 AST 16 and ALT of 18 and previously AST 19 and ALT 19 so those are doing actually little lower.  Ideal ALT less than 25. white blood cell count 9.3 hemoglobin 13.1 platelet count 321 and MCV normal now at 83, previously low 78. Neutrophils 5.3 and lymphocytes remain elevated at 3.2 and previously 3.04.  She did mri but we will let her know when get it.  August 11 2023 MRI shows in the lower chest views, post right mastectomy expected postoperative changes were noted.  Left breast prosthesis was seen.  Trace bilateral pleural effusions were seen with some associated atelectasis.  Please share with primary provider. Liver showed no fat or iron and they do see changes of left lateral sector resection of segment 2/3 for that adenoma. They still see multiple R2 enhancing lesions somewhat with central scar with persistent has been delayed hepatobiliary phase imaging consistent with focal nodular hyperplasia including in segment 8 and 8.2 x 6.0 cm 1 that is felt unchanged.  Segment seven 4.9 x 3.6 cm minimally decreased in size.  Segment two 2.2 x 1.8  cm grossly unchanged.  Caudate lobe 2.0 x 1.6 cm and unchanged. Other lesions that are favored to be adenomas are seen in segment 4A measuring 2.6 x 1.9 cm unchanged.  Segment three 2.2 x 1.6 cm grossly unchanged. Segment eight 2.5 x 2.1 cm grossly unchanged. Segment six 4.2 x 2.7 cm also unchanged.  There are some other additional adenomas noted which were unchanged but no sizes given.  Gallbladder/biliary tree was normal. Spleen, pancreas, adrenal glands, and lymph nodes and vessels were normal. They see some multiple simple appearing cysts in the kidneys with no sizes given. They see some scattered vertebral body hemangiomas as well as a small fat-containing left ventral hernia. Overall they felt that you had numerous unchanged hepatic lesions consistent with focal nodular aplasia and hepatic adenomas and evidence of the resection of the segment 2/3 exophytic hepatic adenoma noted. We will review this at visit but plan a redo in 6m to follow the adenomas more so than the FNH lesions.  Aug 8 labs: White blood cell count 9 hemoglobin 12.5 which is down from 14.3 in December.  Platelet count 310 which is normal but slightly down from 360 before.  MCV down to 78 and was normal before.  You may be iron deficient.  Please check that.  MCH was slightly low at 25.9. Neutrophils normal at 5.0 and lymphocytes elevated at 3.2.  Sometimes she can see that with her recent viral illness. Have you been ill? Sugar elevated at 131 from previously 89 which was normal.  BUN of 10 cr 0.59 sodium 140 potassium 4.3 albumin 4.4 bilirubin 0.5 alkaline phosphatase 90 AST 19 and ALT 19.  These are actually lower than previous AST 20 and ALT 29 December. In summary, hemoglobin a little lower, and MCV lower, will check your iron level locally and suspect you may be iron deficient.  She says she she is to do the iron levels.  Dec 28 mri  Lower thorax where seen showed only trace bilateral pleural effusions. Liver showed no fat or iron.  They saw interval resection since last mri of the previously dominant exophytic hepatic adenoma in segment 2/3 without any findings of local recurrence.   They still see multiple arterially enhancing lesions consistent with simple scar consistent consistent with focal nodular hyperplasia.  Overall these lesions are similar in size and appearance to the May 2021 study with the largest being an 8.2 x 6.8 cm lesion in segment 8, 5.2 x 3.6 cm in segment 7, 2.1 x 1.6 cm in segment 2, and 2.2 x 1.4 cm in the caudate lobe. Some other additional arterial enhancing lesions with internal fat seen that were felt to be favoring hepatic adenomas also seen including in segment 4A a 2.5 x 1.9 cm lesion, a segment three 2.3 x 1.9 lesion, segment eight 2.5 x 2.1 cm lesion, and segment 6 of 4.2 x 2.9 cm lesion as well. Multiple additional smaller scattered hepatic adenomas seen in the right and left hepatic lobes. Gallbladder/biliary tree were normal, as were spleen, pancreas, adrenals and lymph nodes. Kidneys showed renal cysts but no hydronephrosis.   Another new finding was this 3.4 x 4.9 cm right nonenhancing fluid collection seen in the lateral right breast. Small fat-containing ventral hernia seen as well as scattered vertebral body hemangiomas. Overall they saw interval resection of the segment 2/3 large exophytic hepatic adenoma since the last scan and without local recurrence.  They aslo saw a combination of FNH and adenomas that persist.  They suspect that you have a hematoma noted in the lateral right breast soft tissues. We need to redo the mri in 6m. Called the pt and she had an excisional biopsy done of the breast area. 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 She says offered tamoxifen and she is worried re about its effects and she says she is exploring other options including more surgery for this.  She did March 2022 and she had infection and implant removed in may and going sept 16 for the implant.  November 15 labs show normal INR at 1.0 glucose 89 BUN of 10 creatinine 0.68 sodium 139 potassium 4.3 calcium 9.1 albumin 4.0 bilirubin 0.4 alkaline phosphatase 83 AST 20 and ALT 26.  These are in normal range with ideal ALT being slightly lower at 25 or less.White blood cell count 9.9 hemoglobin 12.3 platelet count 298 MCV 92 and neutrophils 6.1 lymphocytes 3.0. Overall these laboratories are all within lab normal range.  She was on hormones for 15 days for the patch and then stopped it and that was when she felt the chest diff and then did the bx.  she testosterone implants for 2 yrs.  Ms. Ling is s/p laparoscopic left lateral sectorectomy on 6/29/21 for a dominant exophytic 9.1 cm hepatic adenoma of left lateral lobe with Dr. Teodoro Santiago.  She is recovering well aside from persistent diarrhea/constipation, last BM 3 days ago- taking fiber.   Will f/u with GI if symptoms persist.  Pain well controlled with occasional need for tylenol. Pathology showed adenoma, negative for carcinoma and margins negative.  Following up Dr. Santiago PRN.   Will need follow up imaging to evaluate remaining adenomas/FNHs.    6/29/21 Additional Clinical Information  Pathologic Diagnosis A.  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    RESECTION MARGIN IS NEGATIVE.   . NEGATIVE FOR CARCINOMA. [1]  5/20/21 MRI with Eovist. They see the liver has no fat or iron but with multiple lesions seen. Some of them have clear central scars with persistent enhancement favoring focal nodular hyperplasia such as in segment 8 given 8.2 x 6.8 cm lesion, segment 7 a 7.2 x 4.2 cm lesion and in segment two 2.1 x 1.6 cm lesion. Additional lesions have internal fat and washout on delayed imaging which favors hepatic adenomas this includes the dominant lesion in segment 2 measuring 8.9 x 8.2 cm as well as additional lesions in segment 4A which was 2.5 x 1.9 and in segment three 2.3 x 1.9 cm and in segment eight 2.4 x 1.3 cm and she 6 1.2x0.8cm.  Additional subcentimeter lesions seen throughout the liver. GB and biliary tree normal. Spleen normal and kidneys show small right renal cysts. Liver vessels patent.  Overall they felt you had a combination of focal nodular hyperplasias and adenomas.   April 8 labs:  Sodium 139 k 4.2 chloride 104 CO2 25 glucose 96 BUN of 18 creatinine 0.8 calcium 9.3 corrected calcium 9.4 albumin 3.9 bilirubin 0.6 alkaline phosphatase 70 AST 17 ALT of 18.  Liver enzymes are at ideal level.  Beta natruretic peptide was normal at 13.  Lipase was normal at 13.  Magnesium was normal at 2.1.  TSH 1.832.  White count 10.6 hemoglobin 12.7 plate count 341.  Neutrophils were 5.8.  MCV normal at 90.5.  INR normal 1.0.  Saw also echocardiogram dated April 5 shows an ejection fraction of 55 to 60% right ventricle normal in size and function.  CT chest April 15 review by radiology here suggested she have three-vessel aortic arch noted.  No significant atherosclerotic disease was seen.  No significant coronary artery calcification was seen.  Normal caliber to the main pulmonary artery was seen.  There was no central filling defect that was seen.  Heart size was normal with no pericardial effusion.  Did see a small hiatal hernia.  No pathologically enlarged lymph nodes were seen.  The airways were patent.  They did see a cluster of micronodules within the right lower lobe which they said could be infectious or inflammatory etiology of the recommended attention on follow-up.  I would recommend that you follow-up with your local doctor regarding that.  They did mention again that they saw the multiple liver lesions largest being in segment 7/8.  They also saw some fluid attenuating simple appearing renal cyst within the right kidney up to 1.5 x 1.5 cm.  MRI also read on April 15.  We mentioned that the liver had no fat or prior.  They did mention that intralesional T2 hyperintense stellate-like scars were seen in the hands and delayed phases and the larger lesions.  No evidence of intralesional fat was seen.  Several of the smaller lesions are visible on the diffusion sequences and/or the CT.  Examples I gave her in segment 8 of 1.7 x 7.2 cm, segment 7 had a 5.6 x 3.3 cm 1, in segment 1 these are 1.3 x 1.9 cm 1. An exophytic 6.5 x 7.9 cm lesion was seen in the gastrosplenic ligament adjacent to the left lobe and the greater curvature of the stomach.  This 1 also had the central stellate-like scar that was seen.  The CT much better demonstrates a left hepatic arterial branches coursing into this exophytic lesion proving its hepatic origin.  Lesion of similar intensity was seen in segment 5/6 measuring 2.3 x 3.6 cm as well. The biliary tree appeared to be normal, spleen was normal, pancreas was normal, kidney showed a renal cyst. They felt that these lesions in the liver are very suggestive of focal nodular hyperplasia.  The largest being an exophytic lesion arising from the left lobe measuring 7.9 cm.  The did recommend further evaluation with a Eovist for definitive diagnosis.  Spoke with pt: Explained need the mri with eovist to be done to better see the liver lesions.  Also mentioned to have primary MD review the chest ct nonspecific findings seen.  She stays liver lesions first noted 3/18 with hospital for epigastric/chest pain.  Went to [place] [facility] and CT showed a liver lesion.  MRI showed 2 lesions, one larger to center and to left of the liver 8 x 3 cm and several other smaller ones.    Normal cardiac work up.  She has hx of OCP use many  years ago (about 2 years total) and was on estradiol patch March 3.  On March 17, she developed pain and stopped  patch March 24.  Testosterone pellets x 1.5 years ago, last in Feb 2021.  Also hx of progesterone supplementation with one prior pregnancy.   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   Plan: 1.  March 2024. Plan for labs aheadf. 2.  Pt did mri and pending. 3.  Pt is staying off the hormonal tx.  4.   Pt will call if issues.   Duration of the visit was  35 minutes with 10 minutes of chart prep and 25  minutes for the face to face visit with time spent reviewing recent records current status and future plans for the patient.

## 2023-09-11 NOTE — HPI-TODAY'S VISIT:
Quinten Ling, Sept 11 mri: Lower thorax with bilateral breast implants in place. Liver did not show any sig fat or iron. They saw status post partial left lateral sectorectomy of segments 2/3, grossly unchanged multiple arterially enhancing lesions with persistent delayed hepatobiliary phase enhancement and central scar: -Segment 8 lesion measuring 8.1 x 6.7 cm (34:13) previously 8.0 x 6.2 cm -Segment 7 lesion measuring 5.5 x 3.8 cm (34:30) previously 5.5 x 3.5 cm -Segment 2 lesion measuring 2.9 x 2.1 cm (34:26) previously 2.5 x 1.8 cm -Caudate lobe lesion measuring 2.8 x 2.6 cm (34:35) previously 2.7 x 2.5 cm   Additional arterially enhancing lesions with washout on delayed hepatobiliary phase images again likely hepatic adenomas without evidence of internal hemorrhage: -Segment 3 lesion measuring 2.1 x 1.6 cm (34:39) previously 1.9 x 1.5 cm -Segment 8 lesion measuring 2.8 x 2.0 cm (3 4:17) previously 2.6 x 2.1 cm -Segment 6 lesion measuring 4.7 x 3.0 cm (34:71) previously 4.5 x 2.7 cm Multiple additional scattered similar appearing smaller lesions likely adenomas, grossly unchanged. Gallbladder/biliary tree appeared normal. The spleen, pancreas, adrenal glands were normal. Kidneys showed right renal cyst with no hydronephrosis. Gastrointestinal views showed no obstruction. No pathologically enlarged lymph nodes were seen. Vessels appeared normal as were the peritoneum and retroperitoneum.  No aggressive osseous lesions seen. In summary, they felt that the overall impression was that he had unchanged FNH and adenomas largest being in segment 6 measuring 4.5 cm with no new or enlarging lesions. Our plan would be to continue the 6 months interval imaging. Spoke with pt re the scan and we will mail. Dr Hernandez

## 2023-12-19 ENCOUNTER — APPOINTMENT (OUTPATIENT)
Dept: URBAN - METROPOLITAN AREA CLINIC 210 | Age: 58
Setting detail: DERMATOLOGY
End: 2023-12-19

## 2023-12-19 DIAGNOSIS — D485 NEOPLASM OF UNCERTAIN BEHAVIOR OF SKIN: ICD-10-CM

## 2023-12-19 PROBLEM — D48.5 NEOPLASM OF UNCERTAIN BEHAVIOR OF SKIN: Status: ACTIVE | Noted: 2023-12-19

## 2023-12-19 PROCEDURE — OTHER BIOPSY BY SHAVE METHOD: OTHER

## 2023-12-19 PROCEDURE — OTHER MIPS QUALITY: OTHER

## 2023-12-19 PROCEDURE — 11103 TANGNTL BX SKIN EA SEP/ADDL: CPT

## 2023-12-19 PROCEDURE — 11102 TANGNTL BX SKIN SINGLE LES: CPT

## 2023-12-19 ASSESSMENT — LOCATION DETAILED DESCRIPTION DERM
LOCATION DETAILED: RIGHT POPLITEAL SKIN
LOCATION DETAILED: RIGHT PROXIMAL POSTERIOR THIGH
LOCATION DETAILED: RIGHT ANTERIOR DISTAL THIGH

## 2023-12-19 ASSESSMENT — LOCATION SIMPLE DESCRIPTION DERM
LOCATION SIMPLE: RIGHT POSTERIOR THIGH
LOCATION SIMPLE: RIGHT POPLITEAL SKIN
LOCATION SIMPLE: RIGHT THIGH

## 2023-12-19 ASSESSMENT — LOCATION ZONE DERM: LOCATION ZONE: LEG

## 2023-12-19 NOTE — PROCEDURE: BIOPSY BY SHAVE METHOD
Detail Level: Detailed
Depth Of Biopsy: dermis
Was A Bandage Applied: Yes
Size Of Lesion In Cm: 0
Biopsy Type: H and E
Biopsy Method: Personna blade
Anesthesia Type: 1% lidocaine with epinephrine
Anesthesia Volume In Cc: 0.5
Hemostasis: Drysol
Wound Care: Mupirocin
Dressing: bandage
Destruction After The Procedure: No
Type Of Destruction Used: Curettage
Curettage Text: The wound bed was treated with curettage after the biopsy was performed.
Cryotherapy Text: The wound bed was treated with cryotherapy after the biopsy was performed.
Electrodesiccation Text: The wound bed was treated with electrodesiccation after the biopsy was performed.
Electrodesiccation And Curettage Text: The wound bed was treated with electrodesiccation and curettage after the biopsy was performed.
Silver Nitrate Text: The wound bed was treated with silver nitrate after the biopsy was performed.
Lab: [id number]
Consent: Written consent was obtained and risks were reviewed including but not limited to scarring, infection, bleeding, scabbing, incomplete removal, nerve damage and allergy to anesthesia.
Post-Care Instructions: I reviewed with the patient in detail post-care instructions. Patient is to keep the biopsy site dry overnight, and then apply Aquaphor or Vaseline or twice daily until healed. The purpose of this is to prevent the formation of a scab. Patient may apply hydrogen peroxide soaks to remove any crusting.
Notification Instructions: Patient will be notified of biopsy results. However, patient instructed to call the office if not contacted within 1 week.
Billing Type: Third-Party Bill
Information: Selecting Yes will display possible errors in your note based on the variables you have selected. This validation is only offered as a suggestion for you. PLEASE NOTE THAT THE VALIDATION TEXT WILL BE REMOVED WHEN YOU FINALIZE YOUR NOTE. IF YOU WANT TO FAX A PRELIMINARY NOTE YOU WILL NEED TO TOGGLE THIS TO 'NO' IF YOU DO NOT WANT IT IN YOUR FAXED NOTE.

## 2024-03-01 ENCOUNTER — LAB (OUTPATIENT)
Dept: URBAN - METROPOLITAN AREA CLINIC 86 | Facility: CLINIC | Age: 59
End: 2024-03-01

## 2024-03-07 ENCOUNTER — LAB (OUTPATIENT)
Dept: URBAN - METROPOLITAN AREA CLINIC 86 | Facility: CLINIC | Age: 59
End: 2024-03-07

## 2024-03-11 ENCOUNTER — OV EP (OUTPATIENT)
Dept: URBAN - METROPOLITAN AREA CLINIC 86 | Facility: CLINIC | Age: 59
End: 2024-03-11

## 2024-03-27 ENCOUNTER — APPOINTMENT (OUTPATIENT)
Dept: URBAN - METROPOLITAN AREA CLINIC 210 | Age: 59
Setting detail: DERMATOLOGY
End: 2024-04-02

## 2024-03-27 DIAGNOSIS — L82.0 INFLAMED SEBORRHEIC KERATOSIS: ICD-10-CM

## 2024-03-27 DIAGNOSIS — L72.8 OTHER FOLLICULAR CYSTS OF THE SKIN AND SUBCUTANEOUS TISSUE: ICD-10-CM

## 2024-03-27 DIAGNOSIS — Z12.83 ENCOUNTER FOR SCREENING FOR MALIGNANT NEOPLASM OF SKIN: ICD-10-CM

## 2024-03-27 DIAGNOSIS — D18.0 HEMANGIOMA: ICD-10-CM

## 2024-03-27 DIAGNOSIS — L82.1 OTHER SEBORRHEIC KERATOSIS: ICD-10-CM

## 2024-03-27 PROBLEM — D23.71 OTHER BENIGN NEOPLASM OF SKIN OF RIGHT LOWER LIMB, INCLUDING HIP: Status: ACTIVE | Noted: 2024-03-27

## 2024-03-27 PROBLEM — D23.72 OTHER BENIGN NEOPLASM OF SKIN OF LEFT LOWER LIMB, INCLUDING HIP: Status: ACTIVE | Noted: 2024-03-27

## 2024-03-27 PROBLEM — D18.01 HEMANGIOMA OF SKIN AND SUBCUTANEOUS TISSUE: Status: ACTIVE | Noted: 2024-03-27

## 2024-03-27 PROCEDURE — OTHER LIQUID NITROGEN: OTHER

## 2024-03-27 PROCEDURE — OTHER COUNSELING: OTHER

## 2024-03-27 PROCEDURE — 17111 DESTRUCT LESION 15 OR MORE: CPT

## 2024-03-27 PROCEDURE — OTHER MIPS QUALITY: OTHER

## 2024-03-27 PROCEDURE — 99213 OFFICE O/P EST LOW 20 MIN: CPT | Mod: 25

## 2024-03-27 ASSESSMENT — LOCATION ZONE DERM
LOCATION ZONE: EYELID
LOCATION ZONE: AXILLAE
LOCATION ZONE: ARM
LOCATION ZONE: NECK
LOCATION ZONE: FACE
LOCATION ZONE: TRUNK

## 2024-03-27 ASSESSMENT — LOCATION DETAILED DESCRIPTION DERM
LOCATION DETAILED: PERIUMBILICAL SKIN
LOCATION DETAILED: LEFT CLAVICULAR NECK
LOCATION DETAILED: LEFT MEDIAL SUPERIOR EYELID
LOCATION DETAILED: LEFT LATERAL SUPERIOR EYELID
LOCATION DETAILED: RIGHT AXILLARY VAULT
LOCATION DETAILED: LEFT INFERIOR LATERAL NECK
LOCATION DETAILED: LEFT ANTERIOR SHOULDER
LOCATION DETAILED: RIGHT INFERIOR ANTERIOR NECK
LOCATION DETAILED: LEFT MEDIAL SUPERIOR CHEST
LOCATION DETAILED: LEFT LATERAL EYEBROW
LOCATION DETAILED: RIGHT INFERIOR LATERAL NECK

## 2024-03-27 ASSESSMENT — LOCATION SIMPLE DESCRIPTION DERM
LOCATION SIMPLE: CHEST
LOCATION SIMPLE: ABDOMEN
LOCATION SIMPLE: RIGHT ANTERIOR NECK
LOCATION SIMPLE: LEFT SUPERIOR EYELID
LOCATION SIMPLE: RIGHT AXILLARY VAULT
LOCATION SIMPLE: LEFT SHOULDER
LOCATION SIMPLE: LEFT ANTERIOR NECK
LOCATION SIMPLE: LEFT EYEBROW

## 2024-03-27 NOTE — PROCEDURE: LIQUID NITROGEN
Consent: The patient's consent was obtained including but not limited to risks of crusting, scabbing, blistering, scarring, darker or lighter pigmentary change, recurrence, incomplete removal and infection.
Detail Level: Detailed
Add 52 Modifier (Optional): no
Medical Necessity Clause: This procedure was medically necessary because the lesions that were treated were:
Show Applicator Variable?: Yes
Medical Necessity Information: It is in your best interest to select a reason for this procedure from the list below. All of these items fulfill various CMS LCD requirements except the new and changing color options.
Spray Paint Text: The liquid nitrogen was applied to the skin utilizing a spray paint frosting technique.
Post-Care Instructions: I reviewed with the patient in detail post-care instructions. Patient is to wear sunprotection, and avoid picking at any of the treated lesions. Pt may apply Vaseline to crusted or scabbing areas.

## 2024-06-03 ENCOUNTER — LAB OUTSIDE AN ENCOUNTER (OUTPATIENT)
Dept: URBAN - METROPOLITAN AREA CLINIC 86 | Facility: CLINIC | Age: 59
End: 2024-06-03

## 2024-06-04 ENCOUNTER — TELEPHONE ENCOUNTER (OUTPATIENT)
Dept: URBAN - METROPOLITAN AREA CLINIC 86 | Facility: CLINIC | Age: 59
End: 2024-06-04

## 2024-06-04 LAB
A/G RATIO: 1.8
ALBUMIN: 4.4
ALKALINE PHOSPHATASE: 93
ALT (SGPT): 19
AST (SGOT): 15
BASO (ABSOLUTE): 0.1
BASOS: 1
BILIRUBIN, TOTAL: 0.8
BUN/CREATININE RATIO: 20
BUN: 13
CALCIUM: 9.7
CARBON DIOXIDE, TOTAL: 24
CHLORIDE: 104
CREATININE: 0.66
EGFR: 102
EOS (ABSOLUTE): 0.1
EOS: 2
GLOBULIN, TOTAL: 2.5
GLUCOSE: 94
HEMATOCRIT: 44.2
HEMATOLOGY COMMENTS:: (no result)
HEMOGLOBIN: 14.3
IMMATURE CELLS: (no result)
IMMATURE GRANS (ABS): 0
IMMATURE GRANULOCYTES: 0
LYMPHS (ABSOLUTE): 3.1
LYMPHS: 35
MCH: 27.3
MCHC: 32.4
MCV: 84
MONOCYTES(ABSOLUTE): 0.5
MONOCYTES: 6
NEUTROPHILS (ABSOLUTE): 4.9
NEUTROPHILS: 56
NRBC: (no result)
PLATELETS: 299
POTASSIUM: 4.9
PROTEIN, TOTAL: 6.9
RBC: 5.24
RDW: 13.1
SODIUM: 142
WBC: 8.7

## 2024-06-04 NOTE — HPI-TODAY'S VISIT:
Quinten Ling, Jodee 3 labs showed WBC 8.7 hemoglobin 14.3 platelet count 299 MCV 84 and these are normal range.  Neutrophils and lymphocytes normal as well. Glucose 94, BUN 13 creatinine 0.66 sodium 142 potassium 4.9 calcium 9.7 albumin 4.4 bilirubin 0.8 alk phos 93 AST 15 ALT of 19 and previous AST 16 ALT 21. Good to see that the labs are stable. Dr Hernandez

## 2024-06-10 ENCOUNTER — OFFICE VISIT (OUTPATIENT)
Dept: URBAN - METROPOLITAN AREA CLINIC 86 | Facility: CLINIC | Age: 59
End: 2024-06-10
Payer: COMMERCIAL

## 2024-06-10 ENCOUNTER — LAB OUTSIDE AN ENCOUNTER (OUTPATIENT)
Dept: URBAN - METROPOLITAN AREA CLINIC 86 | Facility: CLINIC | Age: 59
End: 2024-06-10

## 2024-06-10 VITALS
HEART RATE: 70 BPM | DIASTOLIC BLOOD PRESSURE: 81 MMHG | TEMPERATURE: 96.7 F | WEIGHT: 171 LBS | SYSTOLIC BLOOD PRESSURE: 132 MMHG | BODY MASS INDEX: 29.19 KG/M2 | HEIGHT: 64 IN

## 2024-06-10 DIAGNOSIS — R93.5 ABNORMAL MRI OF ABDOMEN: ICD-10-CM

## 2024-06-10 DIAGNOSIS — D13.4 HEPATIC ADENOMA: ICD-10-CM

## 2024-06-10 DIAGNOSIS — K76.89 LIVER LESION: ICD-10-CM

## 2024-06-10 DIAGNOSIS — Z79.899 ADALIMUMAB (HUMIRA) LONG-TERM USE: ICD-10-CM

## 2024-06-10 PROCEDURE — 99215 OFFICE O/P EST HI 40 MIN: CPT

## 2024-06-10 RX ORDER — MULTIVIT-MIN/IRON/FOLIC ACID/K 18-600-40
1 CAPSULE CAPSULE ORAL ONCE A DAY
Status: ACTIVE | COMMUNITY

## 2024-06-10 RX ORDER — ESCITALOPRAM OXALATE 5 MG/1
1 TABLET TABLET, FILM COATED ORAL ONCE A DAY
Status: ACTIVE | COMMUNITY

## 2024-06-10 NOTE — HPI-TODAY'S VISIT:
Pt is a 59 year old female, after a previous office visit in sept 2023 and initially referred by Dr. Sowmya Smith in [place] for an evaluation for multiple liver  lesions.   A copy of the note will be sent to the referring provider.   June 7 did the mri 2pm and see that it was done but not read and pending.   Jodee 3 labs showed WBC 8.7 hemoglobin 14.3 platelet count 299 MCV 84 and these are normal range. Neutrophils and lymphocytes normal as well.  Glucose 94, BUN 13 creatinine 0.66 sodium 142 potassium 4.9 calcium 9.7 albumin 4.4 bilirubin 0.8 alk phos 93 AST 15 ALT of 19 and previous AST 16 ALT 21.  Good to see that the labs are stable.  Sept 11 2023 mri: Lower thorax with bilateral breast implants in place. Liver did not show any sig fat or iron. They saw status post partial left lateral sectorectomy of segments 2/3, grossly unchanged multiple arterially enhancing lesions with persistent delayed hepatobiliary phase enhancement and central scar: -Segment 8 lesion measuring 8.1 x 6.7 cm (34:13) previously 8.0 x 6.2 cm -Segment 7 lesion measuring 5.5 x 3.8 cm (34:30) previously 5.5 x 3.5 cm -Segment 2 lesion measuring 2.9 x 2.1 cm (34:26) previously 2.5 x 1.8 cm -Caudate lobe lesion measuring 2.8 x 2.6 cm (34:35) previously 2.7 x 2.5 cm  Additional arterially enhancing lesions with washout on delayed hepatobiliary phase images again likely hepatic adenomas without evidence of internal hemorrhage: -Segment 3 lesion measuring 2.1 x 1.6 cm (34:39) previously 1.9 x 1.5 cm -Segment 8 lesion measuring 2.8 x 2.0 cm (3 4:17) previously 2.6 x 2.1 cm -Segment 6 lesion measuring 4.7 x 3.0 cm (34:71) previously 4.5 x 2.7 cm Multiple additional scattered similar appearing smaller lesions likely adenomas, grossly unchanged. Gallbladder/biliary tree appeared normal. The spleen, pancreas, adrenal glands were normal. Kidneys showed right renal cyst with no hydronephrosis. Gastrointestinal views showed no obstruction. No pathologically enlarged lymph nodes were seen. Vessels appeared normal as were the peritoneum and retroperitoneum. No aggressive osseous lesions seen. In summary, they felt that the overall impression was that he had unchanged FNH and adenomas largest being in segment 6 measuring 4.5 cm with no new or enlarging lesions. Our plan would be to continue the 6 months interval imaging. Spoke with pt re the scan and we will mail.   Aug 28 20-23 labs show glucose 99, BUN of 14, creatinine 0.61 sodium 141 potassium 5.1 calcium 9.5 albumin 4.5 bilirubin 0.6 alk phos 90 AST 16 ALT 21 with ideal ALT less than 25. WBC 8.6, hemoglobin 13.6 platelet count 296, MCV 84 and these are all normal which is good to see.  Neutrophils 5.1 and lymphocytes 2.8 and these are normal as well.  Did the mri and pending.  February 13 final  MRI shows lower thorax with bilateral mastectomies and breast augmentation.  Trace pleural effusions seen. Liver showed no fat or iron but did have close surgical changes of the left lateral segment resection of the prior adenoma. They redemonstrated multiple arterial lesions some of which are central scar persist enhancement on delayed hepatobiliary phase compatible with focal nodular plasia such as segment 8 having an 8.0 x 6.2 lesion that was previously 8.2 x 6.0 cm. Segment 7 lesion measuring 5.5 x 3.5 previously 4.9 x 3.6 cm. Segment 2 lesion measuring 2.5 x 1.8 cm and previously 2.2 x 1.8 cm. Segment 1 lesion 2.7 x 2.5 previously 2.0 x 1.6 cm. Additional arterial enhancing lesions show microscopic fat-containing lesions with lack of Eovist uptake favor adenomas include segment 4A 2.9 x 2.1 lesion previously 2.6 x 1.9 cm. Segment 3 lesion measuring 1.9 x 1.5 previously 2.2 x 1.7 cm. Segment 8 lesion measuring 2.6 x 2.1 cm previously 2.5 x 2.1 cm. Segment 6 lesion measuring 4.5 x 2.7 cm and previously 4.2 x 2.7 cm.   Additional other scattered hepatic adenoma seen and there was felt similar. Gallbladder/biliary tree, spleen, pancreas, adrenal glands, kidneys, lymph nodes normal. Mild multilevel degenerative changes seen in the spine. Overall they felt that you had numerous unchanged to slightly increase size of multiple FNH or hepatic adenomas and no definitive new or suspicious liver lesions. Would recommend mri in 6m to follow the lesions.   February 8 labs show glucose 91 BUN of 13 creatinine 0.69 sodium 141 potassium 4.6 chloride 102 CO2 24 calcium 9.6 albumin 4.5 bilirubin 0.8 alkaline phosphatase 91 AST 16 and ALT of 18 and previously AST 19 and ALT 19 so those are doing actually little lower.  Ideal ALT less than 25. white blood cell count 9.3 hemoglobin 13.1 platelet count 321 and MCV normal now at 83, previously low 78. Neutrophils 5.3 and lymphocytes remain elevated at 3.2 and previously 3.04.  She did mri but we will let her know when get it.  August 11 2023 MRI shows in the lower chest views, post right mastectomy expected postoperative changes were noted.  Left breast prosthesis was seen.  Trace bilateral pleural effusions were seen with some associated atelectasis.  Please share with primary provider. Liver showed no fat or iron and they do see changes of left lateral sector resection of segment 2/3 for that adenoma. They still see multiple R2 enhancing lesions somewhat with central scar with persistent has been delayed hepatobiliary phase imaging consistent with focal nodular hyperplasia including in segment 8 and 8.2 x 6.0 cm 1 that is felt unchanged.  Segment seven 4.9 x 3.6 cm minimally decreased in size.  Segment two 2.2 x 1.8  cm grossly unchanged.  Caudate lobe 2.0 x 1.6 cm and unchanged. Other lesions that are favored to be adenomas are seen in segment 4A measuring 2.6 x 1.9 cm unchanged.  Segment three 2.2 x 1.6 cm grossly unchanged. Segment eight 2.5 x 2.1 cm grossly unchanged. Segment six 4.2 x 2.7 cm also unchanged.  There are some other additional adenomas noted which were unchanged but no sizes given.  Gallbladder/biliary tree was normal. Spleen, pancreas, adrenal glands, and lymph nodes and vessels were normal. They see some multiple simple appearing cysts in the kidneys with no sizes given. They see some scattered vertebral body hemangiomas as well as a small fat-containing left ventral hernia. Overall they felt that you had numerous unchanged hepatic lesions consistent with focal nodular aplasia and hepatic adenomas and evidence of the resection of the segment 2/3 exophytic hepatic adenoma noted. We will review this at visit but plan a redo in 6m to follow the adenomas more so than the FNH lesions.  Aug 8 labs: White blood cell count 9 hemoglobin 12.5 which is down from 14.3 in December.  Platelet count 310 which is normal but slightly down from 360 before.  MCV down to 78 and was normal before.  You may be iron deficient.  Please check that.  MCH was slightly low at 25.9. Neutrophils normal at 5.0 and lymphocytes elevated at 3.2.  Sometimes she can see that with her recent viral illness. Have you been ill? Sugar elevated at 131 from previously 89 which was normal.  BUN of 10 cr 0.59 sodium 140 potassium 4.3 albumin 4.4 bilirubin 0.5 alkaline phosphatase 90 AST 19 and ALT 19.  These are actually lower than previous AST 20 and ALT 29 December. In summary, hemoglobin a little lower, and MCV lower, will check your iron level locally and suspect you may be iron deficient.  She says she she is to do the iron levels.  Dec 28 mri  Lower thorax where seen showed only trace bilateral pleural effusions. Liver showed no fat or iron.  They saw interval resection since last mri of the previously dominant exophytic hepatic adenoma in segment 2/3 without any findings of local recurrence.   They still see multiple arterially enhancing lesions consistent with simple scar consistent consistent with focal nodular hyperplasia.  Overall these lesions are similar in size and appearance to the May 2021 study with the largest being an 8.2 x 6.8 cm lesion in segment 8, 5.2 x 3.6 cm in segment 7, 2.1 x 1.6 cm in segment 2, and 2.2 x 1.4 cm in the caudate lobe. Some other additional arterial enhancing lesions with internal fat seen that were felt to be favoring hepatic adenomas also seen including in segment 4A a 2.5 x 1.9 cm lesion, a segment three 2.3 x 1.9 lesion, segment eight 2.5 x 2.1 cm lesion, and segment 6 of 4.2 x 2.9 cm lesion as well. Multiple additional smaller scattered hepatic adenomas seen in the right and left hepatic lobes. Gallbladder/biliary tree were normal, as were spleen, pancreas, adrenals and lymph nodes. Kidneys showed renal cysts but no hydronephrosis.   Another new finding was this 3.4 x 4.9 cm right nonenhancing fluid collection seen in the lateral right breast. Small fat-containing ventral hernia seen as well as scattered vertebral body hemangiomas. Overall they saw interval resection of the segment 2/3 large exophytic hepatic adenoma since the last scan and without local recurrence.  They aslo saw a combination of FNH and adenomas that persist.  They suspect that you have a hematoma noted in the lateral right breast soft tissues. We need to redo the mri in 6m. Called the pt and she had an excisional biopsy done of the breast area. 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 She says offered tamoxifen and she is worried re about its effects and she says she is exploring other options including more surgery for this.  She did March 2022 and she had infection and implant removed in may and going sept 16 for the implant.  November 15 labs show normal INR at 1.0 glucose 89 BUN of 10 creatinine 0.68 sodium 139 potassium 4.3 calcium 9.1 albumin 4.0 bilirubin 0.4 alkaline phosphatase 83 AST 20 and ALT 26.  These are in normal range with ideal ALT being slightly lower at 25 or less.White blood cell count 9.9 hemoglobin 12.3 platelet count 298 MCV 92 and neutrophils 6.1 lymphocytes 3.0. Overall these laboratories are all within lab normal range.  She was on hormones for 15 days for the patch and then stopped it and that was when she felt the chest diff and then did the bx.  she testosterone implants for 2 yrs.  Ms. Ling is s/p laparoscopic left lateral sectorectomy on 6/29/21 for a dominant exophytic 9.1 cm hepatic adenoma of left lateral lobe with Dr. Teodoro Santiago.  She is recovering well aside from persistent diarrhea/constipation, last BM 3 days ago- taking fiber.   Will f/u with GI if symptoms persist.  Pain well controlled with occasional need for tylenol. Pathology showed adenoma, negative for carcinoma and margins negative.  Following up Dr. Santiago PRN.   Will need follow up imaging to evaluate remaining adenomas/FNHs.    6/29/21 Additional Clinical Information  Pathologic Diagnosis A.  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    RESECTION MARGIN IS NEGATIVE.   . NEGATIVE FOR CARCINOMA. [1]  5/20/21 MRI with Eovist. They see the liver has no fat or iron but with multiple lesions seen. Some of them have clear central scars with persistent enhancement favoring focal nodular hyperplasia such as in segment 8 given 8.2 x 6.8 cm lesion, segment 7 a 7.2 x 4.2 cm lesion and in segment two 2.1 x 1.6 cm lesion. Additional lesions have internal fat and washout on delayed imaging which favors hepatic adenomas this includes the dominant lesion in segment 2 measuring 8.9 x 8.2 cm as well as additional lesions in segment 4A which was 2.5 x 1.9 and in segment three 2.3 x 1.9 cm and in segment eight 2.4 x 1.3 cm and she 6 1.2x0.8cm.  Additional subcentimeter lesions seen throughout the liver. GB and biliary tree normal. Spleen normal and kidneys show small right renal cysts. Liver vessels patent.  Overall they felt you had a combination of focal nodular hyperplasias and adenomas.   April 8 labs:  Sodium 139 k 4.2 chloride 104 CO2 25 glucose 96 BUN of 18 creatinine 0.8 calcium 9.3 corrected calcium 9.4 albumin 3.9 bilirubin 0.6 alkaline phosphatase 70 AST 17 ALT of 18.  Liver enzymes are at ideal level.  Beta natruretic peptide was normal at 13.  Lipase was normal at 13.  Magnesium was normal at 2.1.  TSH 1.832.  White count 10.6 hemoglobin 12.7 plate count 341.  Neutrophils were 5.8.  MCV normal at 90.5.  INR normal 1.0.  Saw also echocardiogram dated April 5 shows an ejection fraction of 55 to 60% right ventricle normal in size and function.  CT chest April 15 review by radiology here suggested she have three-vessel aortic arch noted.  No significant atherosclerotic disease was seen.  No significant coronary artery calcification was seen.  Normal caliber to the main pulmonary artery was seen.  There was no central filling defect that was seen.  Heart size was normal with no pericardial effusion.  Did see a small hiatal hernia.  No pathologically enlarged lymph nodes were seen.  The airways were patent.  They did see a cluster of micronodules within the right lower lobe which they said could be infectious or inflammatory etiology of the recommended attention on follow-up.  I would recommend that you follow-up with your local doctor regarding that.  They did mention again that they saw the multiple liver lesions largest being in segment 7/8.  They also saw some fluid attenuating simple appearing renal cyst within the right kidney up to 1.5 x 1.5 cm.  MRI also read on April 15.  We mentioned that the liver had no fat or prior.  They did mention that intralesional T2 hyperintense stellate-like scars were seen in the hands and delayed phases and the larger lesions.  No evidence of intralesional fat was seen.  Several of the smaller lesions are visible on the diffusion sequences and/or the CT.  Examples I gave her in segment 8 of 1.7 x 7.2 cm, segment 7 had a 5.6 x 3.3 cm 1, in segment 1 these are 1.3 x 1.9 cm 1. An exophytic 6.5 x 7.9 cm lesion was seen in the gastrosplenic ligament adjacent to the left lobe and the greater curvature of the stomach.  This 1 also had the central stellate-like scar that was seen.  The CT much better demonstrates a left hepatic arterial branches coursing into this exophytic lesion proving its hepatic origin.  Lesion of similar intensity was seen in segment 5/6 measuring 2.3 x 3.6 cm as well. The biliary tree appeared to be normal, spleen was normal, pancreas was normal, kidney showed a renal cyst. They felt that these lesions in the liver are very suggestive of focal nodular hyperplasia.  The largest being an exophytic lesion arising from the left lobe measuring 7.9 cm.  The did recommend further evaluation with a Eovist for definitive diagnosis.  Spoke with pt: Explained need the mri with eovist to be done to better see the liver lesions.  Also mentioned to have primary MD review the chest ct nonspecific findings seen.  She stays liver lesions first noted 3/18 with hospital for epigastric/chest pain.  Went to [facility] and CT showed a liver lesion.  MRI showed 2 lesions, one larger to center and to left of the liver 8 x 3 cm and several other smaller ones.    Normal cardiac work up.  She has hx of OCP use many  years ago (about 2 years total) and was on estradiol patch March 3.  On March 17, she developed pain and stopped  patch March 24.  Testosterone pellets x 1.5 years ago, last in Feb 2021.  Also hx of progesterone supplementation with one prior pregnancy.   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   Plan: 1. Dec 2024 mri. 2. Pt will do it same day see us and labs before or do a saturday and see how she is doing. 3. Pt will do labs before the visit. 4.  Pt is staying off the hormonal tx.     Duration of the visit was 40  minutes with 10 minutes of chart prep and 30 minutes for the face to face visit with time spent reviewing recent records current status and future plans for the patient.

## 2024-06-11 ENCOUNTER — TELEPHONE ENCOUNTER (OUTPATIENT)
Dept: URBAN - METROPOLITAN AREA CLINIC 86 | Facility: CLINIC | Age: 59
End: 2024-06-11

## 2024-06-11 NOTE — HPI-TODAY'S VISIT:
Quinetn Ling, Jodee 10 mri reading:   Lower thorax views were normal. Liver showed no fat or artery and showed partial left segmentectomy of segment 2/3 of the liver. Arterially enhancing lesions with persistent enhancement and central scar as below: (FNH)   Segment 8 measuring 7.4 x 6.2 cm (11:24), previously 8.1 x 6.7 cm.   Segment 7 measuring 5.7 x 4.0 cm (11:41), previously 5.5 x 3.8 cm.    Segment 1 measuring 3.1 x 2.6 cm (11:45), previously 2.5 x 2.8 cm.   Segment 2 measuring 2.6 x 1.8 cm (11:36), previously 2.9 x 2.1 cm. They saw arterial enhancing lesions without washout and some intralesional fat as evidenced by loss of signal as below: (Adenoma)   Partly exophytic inferior segment 6 measuring 5.0 x 3.2 cm (27:80), previously 4.7 x 3.0 cm.   Anterior segment 3 measuring 2.2 x 1.6 cm (27:40), previously 2.1 x 1.6 cm.   Segment 4A measuring 2.6 x 2.2 cm (27:33), previously 2.8 x 1.9 cm. The gallbladder/biliary tree appeared normal with spleen, pancreas and adrenal glands normal. Kidney showed some mildly complex right upper pole cyst without enhancement measuring 1.9 cm and a few small simple right renal cyst.  No hydronephrosis seen. Lymph node views were normal and vessels were normal.  Patent vessels were seen.  No aggressive osseous lesion was seen in bilateral breast implants were seen. In summary, similar size and number of multiple lesions seen consistent with FNH and hepatic adenomas.  The largest FNH in segment 8 measuring 7.4 cm in the largest adenoma in segment 6 measuring 5 cm.  The FNH appears slightly smaller and the adenoma.  Slightly larger and we definitely need to plan for a 6-month we look with that one as lesions that are adenomas and above 5 cm need to be considered for resection (your prior one was much larger.) Dr. Hernandez

## 2024-06-12 ENCOUNTER — WEB ENCOUNTER (OUTPATIENT)
Dept: URBAN - METROPOLITAN AREA CLINIC 86 | Facility: CLINIC | Age: 59
End: 2024-06-12

## 2024-06-13 ENCOUNTER — TELEPHONE ENCOUNTER (OUTPATIENT)
Dept: URBAN - METROPOLITAN AREA CLINIC 91 | Facility: CLINIC | Age: 59
End: 2024-06-13

## 2024-06-13 ENCOUNTER — WEB ENCOUNTER (OUTPATIENT)
Dept: URBAN - METROPOLITAN AREA CLINIC 86 | Facility: CLINIC | Age: 59
End: 2024-06-13

## 2024-08-14 ENCOUNTER — TELEPHONE ENCOUNTER (OUTPATIENT)
Dept: URBAN - METROPOLITAN AREA CLINIC 91 | Facility: CLINIC | Age: 59
End: 2024-08-14

## 2024-11-25 ENCOUNTER — WEB ENCOUNTER (OUTPATIENT)
Dept: URBAN - METROPOLITAN AREA CLINIC 86 | Facility: CLINIC | Age: 59
End: 2024-11-25

## 2024-11-25 ENCOUNTER — WEB ENCOUNTER (OUTPATIENT)
Dept: URBAN - METROPOLITAN AREA CLINIC 82 | Facility: CLINIC | Age: 59
End: 2024-11-25

## 2024-11-25 ENCOUNTER — LAB OUTSIDE AN ENCOUNTER (OUTPATIENT)
Dept: URBAN - METROPOLITAN AREA CLINIC 86 | Facility: CLINIC | Age: 59
End: 2024-11-25

## 2024-12-02 ENCOUNTER — OFFICE VISIT (OUTPATIENT)
Dept: URBAN - METROPOLITAN AREA TELEHEALTH 2 | Facility: TELEHEALTH | Age: 59
End: 2024-12-02

## 2024-12-02 ENCOUNTER — LAB OUTSIDE AN ENCOUNTER (OUTPATIENT)
Dept: URBAN - METROPOLITAN AREA CLINIC 86 | Facility: CLINIC | Age: 59
End: 2024-12-02

## 2024-12-02 ENCOUNTER — OFFICE VISIT (OUTPATIENT)
Dept: URBAN - METROPOLITAN AREA CLINIC 86 | Facility: CLINIC | Age: 59
End: 2024-12-02

## 2024-12-02 NOTE — HPI-TODAY'S VISIT:
Pt is a 59 year old female, after a previous office visit in June 2024 and initially referred by Dr. Sowmya Smith in [place] for an evaluation for multiple liver  lesions.   A copy of the note will be sent to the referring provider.   Dear Mary Ling,  Jodee 10 mri reading:   Lower thorax views were normal.  Liver showed no fat or artery and showed partial left segmentectomy of segment 2/3 of the liver.  Arterially enhancing lesions with persistent enhancement and central scar as below: (FNH)   Segment 8 measuring 7.4 x 6.2 cm (11:24), previously 8.1 x 6.7 cm.    Segment 7 measuring 5.7 x 4.0 cm (11:41), previously 5.5 x 3.8 cm.    Segment 1 measuring 3.1 x 2.6 cm (11:45), previously 2.5 x 2.8 cm.    Segment 2 measuring 2.6 x 1.8 cm (11:36), previously 2.9 x 2.1 cm.  They saw arterial enhancing lesions without washout and some intralesional fat as evidenced by loss of signal as below: (Adenoma)   Partly exophytic inferior segment 6 measuring 5.0 x 3.2 cm (27:80), previously 4.7 x 3.0 cm.    Anterior segment 3 measuring 2.2 x 1.6 cm (27:40), previously 2.1 x 1.6 cm.    Segment 4A measuring 2.6 x 2.2 cm (27:33), previously 2.8 x 1.9 cm.  The gallbladder/biliary tree appeared normal with spleen, pancreas and adrenal glands normal.  Kidney showed some mildly complex right upper pole cyst without enhancement measuring 1.9 cm and a few small simple right renal cyst. No hydronephrosis seen.  Lymph node views were normal and vessels were normal. Patent vessels were seen. No aggressive osseous lesion was seen in bilateral breast implants were seen.  In summary, similar size and number of multiple lesions seen consistent with FNH and hepatic adenomas. The largest FNH in segment 8 measuring 7.4 cm in the largest adenoma in segment 6 measuring 5 cm. The FNH appears slightly smaller and the adenoma. Slightly larger and we definitely need to plan for a 6-month we look with that one as lesions that are adenomas and above 5 cm need to be considered for resection (your prior one was much larger.) Dr. Hernandez    Jodee 3 labs showed WBC 8.7 hemoglobin 14.3 platelet count 299 MCV 84 and these are normal range. Neutrophils and lymphocytes normal as well.  Glucose 94, BUN 13 creatinine 0.66 sodium 142 potassium 4.9 calcium 9.7 albumin 4.4 bilirubin 0.8 alk phos 93 AST 15 ALT of 19 and previous AST 16 ALT 21.  Good to see that the labs are stable.  Sept 11 2023 mri: Lower thorax with bilateral breast implants in place. Liver did not show any sig fat or iron. They saw status post partial left lateral sectorectomy of segments 2/3, grossly unchanged multiple arterially enhancing lesions with persistent delayed hepatobiliary phase enhancement and central scar: -Segment 8 lesion measuring 8.1 x 6.7 cm (34:13) previously 8.0 x 6.2 cm -Segment 7 lesion measuring 5.5 x 3.8 cm (34:30) previously 5.5 x 3.5 cm -Segment 2 lesion measuring 2.9 x 2.1 cm (34:26) previously 2.5 x 1.8 cm -Caudate lobe lesion measuring 2.8 x 2.6 cm (34:35) previously 2.7 x 2.5 cm  Additional arterially enhancing lesions with washout on delayed hepatobiliary phase images again likely hepatic adenomas without evidence of internal hemorrhage: -Segment 3 lesion measuring 2.1 x 1.6 cm (34:39) previously 1.9 x 1.5 cm -Segment 8 lesion measuring 2.8 x 2.0 cm (3 4:17) previously 2.6 x 2.1 cm -Segment 6 lesion measuring 4.7 x 3.0 cm (34:71) previously 4.5 x 2.7 cm Multiple additional scattered similar appearing smaller lesions likely adenomas, grossly unchanged. Gallbladder/biliary tree appeared normal. The spleen, pancreas, adrenal glands were normal. Kidneys showed right renal cyst with no hydronephrosis. Gastrointestinal views showed no obstruction. No pathologically enlarged lymph nodes were seen. Vessels appeared normal as were the peritoneum and retroperitoneum. No aggressive osseous lesions seen. In summary, they felt that the overall impression was that he had unchanged FNH and adenomas largest being in segment 6 measuring 4.5 cm with no new or enlarging lesions. Our plan would be to continue the 6 months interval imaging. Spoke with pt re the scan and we will mail.   Aug 28 20-23 labs show glucose 99, BUN of 14, creatinine 0.61 sodium 141 potassium 5.1 calcium 9.5 albumin 4.5 bilirubin 0.6 alk phos 90 AST 16 ALT 21 with ideal ALT less than 25. WBC 8.6, hemoglobin 13.6 platelet count 296, MCV 84 and these are all normal which is good to see.  Neutrophils 5.1 and lymphocytes 2.8 and these are normal as well.  Did the mri and pending.  February 13 final  MRI shows lower thorax with bilateral mastectomies and breast augmentation.  Trace pleural effusions seen. Liver showed no fat or iron but did have close surgical changes of the left lateral segment resection of the prior adenoma. They redemonstrated multiple arterial lesions some of which are central scar persist enhancement on delayed hepatobiliary phase compatible with focal nodular plasia such as segment 8 having an 8.0 x 6.2 lesion that was previously 8.2 x 6.0 cm. Segment 7 lesion measuring 5.5 x 3.5 previously 4.9 x 3.6 cm. Segment 2 lesion measuring 2.5 x 1.8 cm and previously 2.2 x 1.8 cm. Segment 1 lesion 2.7 x 2.5 previously 2.0 x 1.6 cm. Additional arterial enhancing lesions show microscopic fat-containing lesions with lack of Eovist uptake favor adenomas include segment 4A 2.9 x 2.1 lesion previously 2.6 x 1.9 cm. Segment 3 lesion measuring 1.9 x 1.5 previously 2.2 x 1.7 cm. Segment 8 lesion measuring 2.6 x 2.1 cm previously 2.5 x 2.1 cm. Segment 6 lesion measuring 4.5 x 2.7 cm and previously 4.2 x 2.7 cm.   Additional other scattered hepatic adenoma seen and there was felt similar. Gallbladder/biliary tree, spleen, pancreas, adrenal glands, kidneys, lymph nodes normal. Mild multilevel degenerative changes seen in the spine. Overall they felt that you had numerous unchanged to slightly increase size of multiple FNH or hepatic adenomas and no definitive new or suspicious liver lesions. Would recommend mri in 6m to follow the lesions.   February 8 labs show glucose 91 BUN of 13 creatinine 0.69 sodium 141 potassium 4.6 chloride 102 CO2 24 calcium 9.6 albumin 4.5 bilirubin 0.8 alkaline phosphatase 91 AST 16 and ALT of 18 and previously AST 19 and ALT 19 so those are doing actually little lower.  Ideal ALT less than 25. white blood cell count 9.3 hemoglobin 13.1 platelet count 321 and MCV normal now at 83, previously low 78. Neutrophils 5.3 and lymphocytes remain elevated at 3.2 and previously 3.04.  She did mri but we will let her know when get it.  August 11 2023 MRI shows in the lower chest views, post right mastectomy expected postoperative changes were noted.  Left breast prosthesis was seen.  Trace bilateral pleural effusions were seen with some associated atelectasis.  Please share with primary provider. Liver showed no fat or iron and they do see changes of left lateral sector resection of segment 2/3 for that adenoma. They still see multiple R2 enhancing lesions somewhat with central scar with persistent has been delayed hepatobiliary phase imaging consistent with focal nodular hyperplasia including in segment 8 and 8.2 x 6.0 cm 1 that is felt unchanged.  Segment seven 4.9 x 3.6 cm minimally decreased in size.  Segment two 2.2 x 1.8  cm grossly unchanged.  Caudate lobe 2.0 x 1.6 cm and unchanged. Other lesions that are favored to be adenomas are seen in segment 4A measuring 2.6 x 1.9 cm unchanged.  Segment three 2.2 x 1.6 cm grossly unchanged. Segment eight 2.5 x 2.1 cm grossly unchanged. Segment six 4.2 x 2.7 cm also unchanged.  There are some other additional adenomas noted which were unchanged but no sizes given.  Gallbladder/biliary tree was normal. Spleen, pancreas, adrenal glands, and lymph nodes and vessels were normal. They see some multiple simple appearing cysts in the kidneys with no sizes given. They see some scattered vertebral body hemangiomas as well as a small fat-containing left ventral hernia. Overall they felt that you had numerous unchanged hepatic lesions consistent with focal nodular aplasia and hepatic adenomas and evidence of the resection of the segment 2/3 exophytic hepatic adenoma noted. We will review this at visit but plan a redo in 6m to follow the adenomas more so than the FNH lesions.  Aug 8 labs: White blood cell count 9 hemoglobin 12.5 which is down from 14.3 in December.  Platelet count 310 which is normal but slightly down from 360 before.  MCV down to 78 and was normal before.  You may be iron deficient.  Please check that.  MCH was slightly low at 25.9. Neutrophils normal at 5.0 and lymphocytes elevated at 3.2.  Sometimes she can see that with her recent viral illness. Have you been ill? Sugar elevated at 131 from previously 89 which was normal.  BUN of 10 cr 0.59 sodium 140 potassium 4.3 albumin 4.4 bilirubin 0.5 alkaline phosphatase 90 AST 19 and ALT 19.  These are actually lower than previous AST 20 and ALT 29 December. In summary, hemoglobin a little lower, and MCV lower, will check your iron level locally and suspect you may be iron deficient.  She says she she is to do the iron levels.  Dec 28 mri  Lower thorax where seen showed only trace bilateral pleural effusions. Liver showed no fat or iron.  They saw interval resection since last mri of the previously dominant exophytic hepatic adenoma in segment 2/3 without any findings of local recurrence.   They still see multiple arterially enhancing lesions consistent with simple scar consistent consistent with focal nodular hyperplasia.  Overall these lesions are similar in size and appearance to the May 2021 study with the largest being an 8.2 x 6.8 cm lesion in segment 8, 5.2 x 3.6 cm in segment 7, 2.1 x 1.6 cm in segment 2, and 2.2 x 1.4 cm in the caudate lobe. Some other additional arterial enhancing lesions with internal fat seen that were felt to be favoring hepatic adenomas also seen including in segment 4A a 2.5 x 1.9 cm lesion, a segment three 2.3 x 1.9 lesion, segment eight 2.5 x 2.1 cm lesion, and segment 6 of 4.2 x 2.9 cm lesion as well. Multiple additional smaller scattered hepatic adenomas seen in the right and left hepatic lobes. Gallbladder/biliary tree were normal, as were spleen, pancreas, adrenals and lymph nodes. Kidneys showed renal cysts but no hydronephrosis.   Another new finding was this 3.4 x 4.9 cm right nonenhancing fluid collection seen in the lateral right breast. Small fat-containing ventral hernia seen as well as scattered vertebral body hemangiomas. Overall they saw interval resection of the segment 2/3 large exophytic hepatic adenoma since the last scan and without local recurrence.  They aslo saw a combination of FNH and adenomas that persist.  They suspect that you have a hematoma noted in the lateral right breast soft tissues. We need to redo the mri in 6m. Called the pt and she had an excisional biopsy done of the breast area. 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 She says offered tamoxifen and she is worried re about its effects and she says she is exploring other options including more surgery for this.  She did March 2022 and she had infection and implant removed in may and going sept 16 for the implant.  November 15 labs show normal INR at 1.0 glucose 89 BUN of 10 creatinine 0.68 sodium 139 potassium 4.3 calcium 9.1 albumin 4.0 bilirubin 0.4 alkaline phosphatase 83 AST 20 and ALT 26.  These are in normal range with ideal ALT being slightly lower at 25 or less.White blood cell count 9.9 hemoglobin 12.3 platelet count 298 MCV 92 and neutrophils 6.1 lymphocytes 3.0. Overall these laboratories are all within lab normal range.  She was on hormones for 15 days for the patch and then stopped it and that was when she felt the chest diff and then did the bx.  she testosterone implants for 2 yrs.  Ms. Ling is s/p laparoscopic left lateral sectorectomy on 6/29/21 for a dominant exophytic 9.1 cm hepatic adenoma of left lateral lobe with Dr. Teodoro Santiago.  She is recovering well aside from persistent diarrhea/constipation, last BM 3 days ago- taking fiber.   Will f/u with GI if symptoms persist.  Pain well controlled with occasional need for tylenol. Pathology showed adenoma, negative for carcinoma and margins negative.  Following up Dr. Santiago PRN.   Will need follow up imaging to evaluate remaining adenomas/FNHs.    6/29/21 Additional Clinical Information  Pathologic Diagnosis A.  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    RESECTION MARGIN IS NEGATIVE.   . NEGATIVE FOR CARCINOMA. [1]  5/20/21 MRI with Eovist. They see the liver has no fat or iron but with multiple lesions seen. Some of them have clear central scars with persistent enhancement favoring focal nodular hyperplasia such as in segment 8 given 8.2 x 6.8 cm lesion, segment 7 a 7.2 x 4.2 cm lesion and in segment two 2.1 x 1.6 cm lesion. Additional lesions have internal fat and washout on delayed imaging which favors hepatic adenomas this includes the dominant lesion in segment 2 measuring 8.9 x 8.2 cm as well as additional lesions in segment 4A which was 2.5 x 1.9 and in segment three 2.3 x 1.9 cm and in segment eight 2.4 x 1.3 cm and she 6 1.2x0.8cm.  Additional subcentimeter lesions seen throughout the liver. GB and biliary tree normal. Spleen normal and kidneys show small right renal cysts. Liver vessels patent.  Overall they felt you had a combination of focal nodular hyperplasias and adenomas.   April 8 labs:  Sodium 139 k 4.2 chloride 104 CO2 25 glucose 96 BUN of 18 creatinine 0.8 calcium 9.3 corrected calcium 9.4 albumin 3.9 bilirubin 0.6 alkaline phosphatase 70 AST 17 ALT of 18.  Liver enzymes are at ideal level.  Beta natruretic peptide was normal at 13.  Lipase was normal at 13.  Magnesium was normal at 2.1.  TSH 1.832.  White count 10.6 hemoglobin 12.7 plate count 341.  Neutrophils were 5.8.  MCV normal at 90.5.  INR normal 1.0.  Saw also echocardiogram dated April 5 shows an ejection fraction of 55 to 60% right ventricle normal in size and function.  CT chest April 15 review by radiology here suggested she have three-vessel aortic arch noted.  No significant atherosclerotic disease was seen.  No significant coronary artery calcification was seen.  Normal caliber to the main pulmonary artery was seen.  There was no central filling defect that was seen.  Heart size was normal with no pericardial effusion.  Did see a small hiatal hernia.  No pathologically enlarged lymph nodes were seen.  The airways were patent.  They did see a cluster of micronodules within the right lower lobe which they said could be infectious or inflammatory etiology of the recommended attention on follow-up.  I would recommend that you follow-up with your local doctor regarding that.  They did mention again that they saw the multiple liver lesions largest being in segment 7/8.  They also saw some fluid attenuating simple appearing renal cyst within the right kidney up to 1.5 x 1.5 cm.  MRI also read on April 15.  We mentioned that the liver had no fat or prior.  They did mention that intralesional T2 hyperintense stellate-like scars were seen in the hands and delayed phases and the larger lesions.  No evidence of intralesional fat was seen.  Several of the smaller lesions are visible on the diffusion sequences and/or the CT.  Examples I gave her in segment 8 of 1.7 x 7.2 cm, segment 7 had a 5.6 x 3.3 cm 1, in segment 1 these are 1.3 x 1.9 cm 1. An exophytic 6.5 x 7.9 cm lesion was seen in the gastrosplenic ligament adjacent to the left lobe and the greater curvature of the stomach.  This 1 also had the central stellate-like scar that was seen.  The CT much better demonstrates a left hepatic arterial branches coursing into this exophytic lesion proving its hepatic origin.  Lesion of similar intensity was seen in segment 5/6 measuring 2.3 x 3.6 cm as well. The biliary tree appeared to be normal, spleen was normal, pancreas was normal, kidney showed a renal cyst. They felt that these lesions in the liver are very suggestive of focal nodular hyperplasia.  The largest being an exophytic lesion arising from the left lobe measuring 7.9 cm.  The did recommend further evaluation with a Eovist for definitive diagnosis.  Spoke with pt: Explained need the mri with eovist to be done to better see the liver lesions.  Also mentioned to have primary MD review the chest ct nonspecific findings seen.  She stays liver lesions first noted 3/18 with hospital for epigastric/chest pain.  Went to [facility] and CT showed a liver lesion.  MRI showed 2 lesions, one larger to center and to left of the liver 8 x 3 cm and several other smaller ones.    Normal cardiac work up.  She has hx of OCP use many  years ago (about 2 years total) and was on estradiol patch March 3.  On March 17, she developed pain and stopped  patch March 24.  Testosterone pellets x 1.5 years ago, last in Feb 2021.  Also hx of progesterone supplementation with one prior pregnancy.   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   Plan: 1. Dec 2024 mri. 2. Pt will do it same day see us and labs before or do a saturday and see how she is doing. 3. Pt will do labs before the visit. 4.  Pt is staying off the hormonal tx.     Duration of the visit was 0  minutes with 10 minutes of chart prep and 30 minutes for the face to face visit with time spent reviewing recent records current status and future plans for the patient.

## 2025-01-28 ENCOUNTER — TELEPHONE ENCOUNTER (OUTPATIENT)
Dept: URBAN - METROPOLITAN AREA CLINIC 86 | Facility: CLINIC | Age: 60
End: 2025-01-28

## 2025-01-28 NOTE — HPI-TODAY'S VISIT:
Quinten Ling, January 28 MRI shows lower thorax were visualized shows bilateral breast implants are intact. Liver with no fat or iron and multiple hepatic lesions with index lesions listed below.  Overall therefore there were no new or enlarging liver lesions. Unchanged hepatic adenomas were seen with the largest being an exophytic 5.0 x 3.1 cm in segment 6 and then multiple other smaller ones including a 2.7 x 2.2 cm adenoma in segment 4A and a 3.0 x 2.2 cm  in segment 8 and a 2.0 x 1.4 cm in segment 2/3. Unchanged FNHs seen including the largest being an 8.0 x 6.6 cm in segment 4A/8 in addition FNH in segment 7 measuring 6.0 x 2.5 and 2.9 x 1.9 cm in segment 2 and 3.8 x 2.9 in the caudate. Gallbladder/biliary tree, spleen, pancreas, adrenal glands, gastrointestinal use, lymph node views and vessels are normal. Kidney shows a right renal cyst but no sizes given. Peritoneum and retroperitoneum normal. Small fat-containing left paramidline epigastric hernia seen without complication.   Overall, unchanged numerous hepatic adenomas/FNH and no bleeding seen.  No new lesions otherwise. Still am carefully watching the fact that the largest adenoma is right on the cusp of 5 cm in segment 6. We need to watch this carefully.   Dr. Hernandez

## 2025-02-01 ENCOUNTER — TELEPHONE ENCOUNTER (OUTPATIENT)
Dept: URBAN - METROPOLITAN AREA CLINIC 86 | Facility: CLINIC | Age: 60
End: 2025-02-01

## 2025-02-01 NOTE — HPI-TODAY'S VISIT:
Dear Mary Ling, Jan 31 labs wbc 8.1 and hg 14.2 and hct 43.6 and mcv 85 and platelets 301 and neutrophils 4.4 and lymphs 3.0. Glucose 94 and bun 18 and cr 0.63 and na 141 and k 4.4 and cl 105 and co2 22 and ca 9.8 and total protein 7.4 and albumin 4.7 and tb 0.8 and alk 99 and ast 22 and alt 25 and ideal alt is less than 25. Dr Hernandez

## 2025-02-03 ENCOUNTER — OFFICE VISIT (OUTPATIENT)
Dept: URBAN - METROPOLITAN AREA TELEHEALTH 2 | Facility: TELEHEALTH | Age: 60
End: 2025-02-03
Payer: COMMERCIAL

## 2025-02-03 VITALS — HEIGHT: 64 IN | BODY MASS INDEX: 29.02 KG/M2 | WEIGHT: 170 LBS

## 2025-02-03 DIAGNOSIS — R53.83 FATIGUE: ICD-10-CM

## 2025-02-03 DIAGNOSIS — K76.89 NODULAR HYPERPLASIA OF LIVER: ICD-10-CM

## 2025-02-03 DIAGNOSIS — Z98.890 HISTORY OF COLONOSCOPY: ICD-10-CM

## 2025-02-03 DIAGNOSIS — R10.9 ABDOMINAL PAIN: ICD-10-CM

## 2025-02-03 DIAGNOSIS — D13.4 HEPATIC ADENOMA: ICD-10-CM

## 2025-02-03 DIAGNOSIS — Z79.899 HIGH RISK MEDICATION USE: ICD-10-CM

## 2025-02-03 DIAGNOSIS — R93.5 ABNORMAL MRI OF ABDOMEN: ICD-10-CM

## 2025-02-03 DIAGNOSIS — Z71.89 VACCINE COUNSELING: ICD-10-CM

## 2025-02-03 DIAGNOSIS — D05.01 LOBULAR CARCINOMA IN SITU OF RIGHT BREAST: ICD-10-CM

## 2025-02-03 DIAGNOSIS — E03.9 HYPOTHYROIDISM, UNSPECIFIED TYPE: ICD-10-CM

## 2025-02-03 DIAGNOSIS — N95.1 PERIMENOPAUSAL: ICD-10-CM

## 2025-02-03 PROCEDURE — 99215 OFFICE O/P EST HI 40 MIN: CPT

## 2025-02-03 RX ORDER — MULTIVIT-MIN/IRON/FOLIC ACID/K 18-600-40
1 CAPSULE CAPSULE ORAL ONCE A DAY
Status: ACTIVE | COMMUNITY

## 2025-02-03 RX ORDER — ESCITALOPRAM OXALATE 5 MG/1
1 TABLET TABLET, FILM COATED ORAL ONCE A DAY
Status: DISCONTINUED | COMMUNITY

## 2025-02-03 NOTE — HPI-TODAY'S VISIT:
Pt is a 60 year old female, after a previous office visit in June 2024 and initially referred by Dr. Sowmya Smith in [place] for an evaluation for multiple liver lesions.  A copy of the note will be sent to the referring provider.  She says they are doing ok with the [place]. She says no power 4-5 days and then power back and no cell phone x weeks and no water for a month and so stayed at other place.  Jan 31 labs wbc 8.1 and hg 14.2 and hct 43.6 and mcv 85 and platelets 301 and neutrophils 4.4 and lymphs 3.0. Glucose 94 and bun 18 and cr 0.63 and na 141 and k 4.4 and cl 105 and co2 22 and ca 9.8 and total protein 7.4 and albumin 4.7 and tb 0.8 and alk 99 and ast 22 and alt 25 and ideal alt is less than 25.  January 28 MRI shows lower thorax were visualized shows bilateral breast implants are intact. Liver with no fat or iron and multiple hepatic lesions with index lesions listed below. Overall therefore there were no new or enlarging liver lesions. Unchanged hepatic adenomas were seen with the largest being an exophytic 5.0 x 3.1 cm in segment 6 and then multiple other smaller ones including a 2.7 x 2.2 cm adenoma in segment 4A and a 3.0 x 2.2 cm in segment 8 and a 2.0 x 1.4 cm in segment 2/3. Unchanged FNHs seen including the largest being an 8.0 x 6.6 cm in segment 4A/8 in addition FNH in segment 7 measuring 6.0 x 2.5 and 2.9 x 1.9 cm in segment 2 and 3.8 x 2.9 in the caudate. Gallbladder/biliary tree, spleen, pancreas, adrenal glands, gastrointestinal use, lymph node views and vessels are normal. Kidney shows a right renal cyst but no sizes given. Peritoneum and retroperitoneum normal. Small fat-containing left paramidline epigastric hernia seen without complication. Overall, unchanged numerous hepatic adenomas/FNH and no bleeding seen. No new lesions otherwise. Still am carefully watching the fact that the largest adenoma is right on the cusp of 5 cm in segment 6.   Jodee 10 mri reading: Lower thorax views were normal. Liver showed no fat or artery and showed partial left segmentectomy of segment 2/3 of the liver. Arterially enhancing lesions with persistent enhancement and central scar as below: (FNH)   Segment 8 measuring 7.4 x 6.2 cm (11:24), previously 8.1 x 6.7 cm.   Segment 7 measuring 5.7 x 4.0 cm (11:41), previously 5.5 x 3.8 cm.   Segment 1 measuring 3.1 x 2.6 cm (11:45), previously 2.5 x 2.8 cm.   Segment 2 measuring 2.6 x 1.8 cm (11:36), previously 2.9 x 2.1 cm. They saw arterial enhancing lesions without washout and some intralesional fat as evidenced by loss of signal as below: (Adenoma)   Partly exophytic inferior segment 6 measuring 5.0 x 3.2 cm (27:80), previously 4.7 x 3.0 cm.   Anterior segment 3 measuring 2.2 x 1.6 cm (27:40), previously 2.1 x 1.6 cm.   Segment 4A measuring 2.6 x 2.2 cm (27:33), previously 2.8 x 1.9 cm. The gallbladder/biliary tree appeared normal with spleen, pancreas and adrenal glands normal. Kidney showed some mildly complex right upper pole cyst without enhancement measuring 1.9 cm and a few small simple right renal cyst. No hydronephrosis seen. Lymph node views were normal and vessels were normal. Patent vessels were seen. No aggressive osseous lesion was seen in bilateral breast implants were seen. In summary, similar size and number of multiple lesions seen consistent with FNH and hepatic adenomas. The largest FNH in segment 8 measuring 7.4 cm in the largest adenoma in segment 6 measuring 5 cm. The FNH appears slightly smaller and the adenoma. Slightly larger and we definitely need to plan for a 6-month we look with that one as lesions that are adenomas and above 5 cm need to be considered for resection (your prior one was much larger.)  Jodee 3 labs showed WBC 8.7 hemoglobin 14.3 platelet count 299 MCV 84 and these are normal range. Neutrophils and lymphocytes normal as well. Glucose 94, BUN 13 creatinine 0.66 sodium 142 potassium 4.9 calcium 9.7 albumin 4.4 bilirubin 0.8 alk phos 93 AST 15 ALT of 19 and previous AST 16 ALT 21. Good to see that the labs are stable. Sept 11 2023 mri: Lower thorax with bilateral breast implants in place. Liver did not show any sig fat or iron. They saw status post partial left lateral sectorectomy of segments 2/3, grossly unchanged multiple arterially enhancing lesions with persistent delayed hepatobiliary phase enhancement and central scar: -Segment 8 lesion measuring 8.1 x 6.7 cm (34:13) previously 8.0 x 6.2 cm -Segment 7 lesion measuring 5.5 x 3.8 cm (34:30) previously 5.5 x 3.5 cm -Segment 2 lesion measuring 2.9 x 2.1 cm (34:26) previously 2.5 x 1.8 cm -Caudate lobe lesion measuring 2.8 x 2.6 cm (34:35) previously 2.7 x 2.5 cm Additional arterially enhancing lesions with washout on delayed hepatobiliary phase images again likely hepatic adenomas without evidence of internal hemorrhage: -Segment 3 lesion measuring 2.1 x 1.6 cm (34:39) previously 1.9 x 1.5 cm -Segment 8 lesion measuring 2.8 x 2.0 cm (3 4:17) previously 2.6 x 2.1 cm -Segment 6 lesion measuring 4.7 x 3.0 cm (34:71) previously 4.5 x 2.7 cm Multiple additional scattered similar appearing smaller lesions likely adenomas, grossly unchanged. Gallbladder/biliary tree appeared normal. The spleen, pancreas, adrenal glands were normal. Kidneys showed right renal cyst with no hydronephrosis. Gastrointestinal views showed no obstruction. No pathologically enlarged lymph nodes were seen. Vessels appeared normal as were the peritoneum and retroperitoneum. No aggressive osseous lesions seen. In summary, they felt that the overall impression was that he had unchanged FNH and adenomas largest being in segment 6 measuring 4.5 cm with no new or enlarging lesions. Our plan would be to continue the 6 months interval imaging. Spoke with pt re the scan and we will mail.  Aug 28 20-23 labs show glucose 99, BUN of 14, creatinine 0.61 sodium 141 potassium 5.1 calcium 9.5 albumin 4.5 bilirubin 0.6 alk phos 90 AST 16 ALT 21 with ideal ALT less than 25. WBC 8.6, hemoglobin 13.6 platelet count 296, MCV 84 and these are all normal which is good to see. Neutrophils 5.1 and lymphocytes 2.8 and these are normal as well. Did the mri and pending. February 13 final MRI shows lower thorax with bilateral mastectomies and breast augmentation. Trace pleural effusions seen. Liver showed no fat or iron but did have close surgical changes of the left lateral segment resection of the prior adenoma. They redemonstrated multiple arterial lesions some of which are central scar persist enhancement on delayed hepatobiliary phase compatible with focal nodular plasia such as segment 8 having an 8.0 x 6.2 lesion that was previously 8.2 x 6.0 cm. Segment 7 lesion measuring 5.5 x 3.5 previously 4.9 x 3.6 cm. Segment 2 lesion measuring 2.5 x 1.8 cm and previously 2.2 x 1.8 cm. Segment 1 lesion 2.7 x 2.5 previously 2.0 x 1.6 cm. Additional arterial enhancing lesions show microscopic fat-containing lesions with lack of Eovist uptake favor adenomas include segment 4A 2.9 x 2.1 lesion previously 2.6 x 1.9 cm. Segment 3 lesion measuring 1.9 x 1.5 previously 2.2 x 1.7 cm. Segment 8 lesion measuring 2.6 x 2.1 cm previously 2.5 x 2.1 cm. Segment 6 lesion measuring 4.5 x 2.7 cm and previously 4.2 x 2.7 cm. Additional other scattered hepatic adenoma seen and there was felt similar. Gallbladder/biliary tree, spleen, pancreas, adrenal glands, kidneys, lymph nodes normal. Mild multilevel degenerative changes seen in the spine. Overall they felt that you had numerous unchanged to slightly increase size of multiple FNH or hepatic adenomas and no definitive new or suspicious liver lesions. Would recommend mri in 6m to follow the lesions.  February 8 labs show glucose 91 BUN of 13 creatinine 0.69 sodium 141 potassium 4.6 chloride 102 CO2 24 calcium 9.6 albumin 4.5 bilirubin 0.8 alkaline phosphatase 91 AST 16 and ALT of 18 and previously AST 19 and ALT 19 so those are doing actually little lower. Ideal ALT less than 25. white blood cell count 9.3 hemoglobin 13.1 platelet count 321 and MCV normal now at 83, previously low 78. Neutrophils 5.3 and lymphocytes remain elevated at 3.2 and previously 3.04. She did mri but we will let her know when get it.  August 11 2023 MRI shows in the lower chest views, post right mastectomy expected postoperative changes were noted. Left breast prosthesis was seen. Trace bilateral pleural effusions were seen with some associated atelectasis. Please share with primary provider. Liver showed no fat or iron and they do see changes of left lateral sector resection of segment 2/3 for that adenoma. They still see multiple R2 enhancing lesions somewhat with central scar with persistent has been delayed hepatobiliary phase imaging consistent with focal nodular hyperplasia including in segment 8 and 8.2 x 6.0 cm 1 that is felt unchanged. Segment seven 4.9 x 3.6 cm minimally decreased in size. Segment two 2.2 x 1.8 cm grossly unchanged. Caudate lobe 2.0 x 1.6 cm and unchanged. Other lesions that are favored to be adenomas are seen in segment 4A measuring 2.6 x 1.9 cm unchanged. Segment three 2.2 x 1.6 cm grossly unchanged. Segment eight 2.5 x 2.1 cm grossly unchanged. Segment six 4.2 x 2.7 cm also unchanged. There are some other additional adenomas noted which were unchanged but no sizes given. Gallbladder/biliary tree was normal. Spleen, pancreas, adrenal glands, and lymph nodes and vessels were normal. They see some multiple simple appearing cysts in the kidneys with no sizes given. They see some scattered vertebral body hemangiomas as well as a small fat-containing left ventral hernia. Overall they felt that you had numerous unchanged hepatic lesions consistent with focal nodular aplasia and hepatic adenomas and evidence of the resection of the segment 2/3 exophytic hepatic adenoma noted. We will review this at visit but plan a redo in 6m to follow the adenomas more so than the FNH lesions.  Aug 8 labs: White blood cell count 9 hemoglobin 12.5 which is down from 14.3 in December. Platelet count 310 which is normal but slightly down from 360 before. MCV down to 78 and was normal before. You may be iron deficient. Please check that. MCH was slightly low at 25.9. Neutrophils normal at 5.0 and lymphocytes elevated at 3.2. Sometimes she can see that with her recent viral illness. Have you been ill? Sugar elevated at 131 from previously 89 which was normal. BUN of 10 cr 0.59 sodium 140 potassium 4.3 albumin 4.4 bilirubin 0.5 alkaline phosphatase 90 AST 19 and ALT 19. These are actually lower than previous AST 20 and ALT 29 December. In summary, hemoglobin a little lower, and MCV lower, will check your iron level locally and suspect you may be iron deficient. She says she she is to do the iron levels. Dec 28 mri Lower thorax where seen showed only trace bilateral pleural effusions. Liver showed no fat or iron. They saw interval resection since last mri of the previously dominant exophytic hepatic adenoma in segment 2/3 without any findings of local recurrence. They still see multiple arterially enhancing lesions consistent with simple scar consistent consistent with focal nodular hyperplasia. Overall these lesions are similar in size and appearance to the May 2021 study with the largest being an 8.2 x 6.8 cm lesion in segment 8, 5.2 x 3.6 cm in segment 7, 2.1 x 1.6 cm in segment 2, and 2.2 x 1.4 cm in the caudate lobe. Some other additional arterial enhancing lesions with internal fat seen that were felt to be favoring hepatic adenomas also seen including in segment 4A a 2.5 x 1.9 cm lesion, a segment three 2.3 x 1.9 lesion, segment eight 2.5 x 2.1 cm lesion, and segment 6 of 4.2 x 2.9 cm lesion as well. Multiple additional smaller scattered hepatic adenomas seen in the right and left hepatic lobes. Gallbladder/biliary tree were normal, as were spleen, pancreas, adrenals and lymph nodes. Kidneys showed renal cysts but no hydronephrosis. Another new finding was this 3.4 x 4.9 cm right nonenhancing fluid collection seen in the lateral right breast. Small fat-containing ventral hernia seen as well as scattered vertebral body hemangiomas. Overall they saw interval resection of the segment 2/3 large exophytic hepatic adenoma since the last scan and without local recurrence. They aslo saw a combination of FNH and adenomas that persist. They suspect that you have a hematoma noted in the lateral right breast soft tissues. We need to redo the mri in 6m. Called the pt and she had an excisional biopsy done of the breast area. 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 She says offered tamoxifen and she is worried re about its effects and she says she is exploring other options including more surgery for this. She did March 2022 and she had infection and implant removed in may and going sept 16 for the implant. November 15 labs show normal INR at 1.0 glucose 89 BUN of 10 creatinine 0.68 sodium 139 potassium 4.3 calcium 9.1 albumin 4.0 bilirubin 0.4 alkaline phosphatase 83 AST 20 and ALT 26. These are in normal range with ideal ALT being slightly lower at 25 or less.White blood cell count 9.9 hemoglobin 12.3 platelet count 298 MCV 92 and neutrophils 6.1 lymphocytes 3.0. Overall these laboratories are all within lab normal range. She was on hormones for 15 days for the patch and then stopped it and that was when she felt the chest diff and then did the bx. she testosterone implants for 2 yrs.  Ms. Ling is s/p laparoscopic left lateral sectorectomy on 6/29/21 for a dominant exophytic 9.1 cm hepatic adenoma of left lateral lobe with Dr. Teodoro Santiago. She is recovering well aside from persistent diarrhea/constipation, last BM 3 days ago- taking fiber. Will f/u with GI if symptoms persist. Pain well controlled with occasional need for tylenol. Pathology showed adenoma, negative for carcinoma and margins negative. Following up Dr. Santiago PRN. Will need follow up imaging to evaluate remaining adenomas/FNHs.  6/29/21 Additional Clinical Information Pathologic Diagnosis A. 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  [place] to do those exercises RESECTION MARGIN IS NEGATIVE. . NEGATIVE FOR CARCINOMA. [1] 5/20/21 MRI with Eovist. They see the liver has no fat or iron but with multiple lesions seen. Some of them have clear central scars with persistent enhancement favoring focal nodular hyperplasia such as in segment 8 given 8.2 x 6.8 cm lesion, segment 7 a 7.2 x 4.2 cm lesion and in segment two 2.1 x 1.6 cm lesion. Additional lesions have internal fat and washout on delayed imaging which favors hepatic adenomas this includes the dominant lesion in segment 2 measuring 8.9 x 8.2 cm as well as additional lesions in segment 4A which was 2.5 x 1.9 and in segment three 2.3 x 1.9 cm and in segment eight 2.4 x 1.3 cm and she 6 1.2x0.8cm. Additional subcentimeter lesions seen throughout the liver. GB and biliary tree normal. Spleen normal and kidneys show small right renal cysts. Liver vessels patent. Overall they felt you had a combination of focal nodular hyperplasias and adenomas. April 8 labs: Sodium 139 k 4.2 chloride 104 CO2 25 glucose 96 BUN of 18 creatinine 0.8 calcium 9.3 corrected calcium 9.4 albumin 3.9 bilirubin 0.6 alkaline phosphatase 70 AST 17 ALT of 18. Liver enzymes are at ideal level. Beta natruretic peptide was normal at 13. Lipase was normal at 13. Magnesium was normal at 2.1. TSH 1.832. White count 10.6 hemoglobin 12.7 plate count 341. Neutrophils were 5.8. MCV normal at 90.5. INR normal 1.0. Saw also echocardiogram dated April 5 shows an ejection fraction of 55 to 60% right ventricle normal in size and function. CT chest April 15 review by radiology here suggested she have three-vessel aortic arch noted. No significant atherosclerotic disease was seen. No significant coronary artery calcification was seen. Normal caliber to the main pulmonary artery was seen. There was no central filling defect that was seen. Heart size was normal with no pericardial effusion. Did see a small hiatal hernia. No pathologically enlarged lymph nodes were seen. The airways were patent. They did see a cluster of micronodules within the right lower lobe which they said could be infectious or inflammatory etiology of the recommended attention on follow-up. I would recommend that you follow-up with your local doctor regarding that. They did mention again that they saw the multiple liver lesions largest being in segment 7/8. They also saw some fluid attenuating simple appearing renal cyst within the right kidney up to 1.5 x 1.5 cm. MRI also read on April 15. We mentioned that the liver had no fat or prior. They did mention that intralesional T2 hyperintense stellate-like scars were seen in the hands and delayed phases and the larger lesions. No evidence of intralesional fat was seen. Several of the smaller lesions are visible on the diffusion sequences and/or the CT. Examples I gave her in segment 8 of 1.7 x 7.2 cm, segment 7 had a 5.6 x 3.3 cm 1, in segment 1 these are 1.3 x 1.9 cm 1. An exophytic 6.5 x 7.9 cm lesion was seen in the gastrosplenic ligament adjacent to the left lobe and the greater curvature of the stomach. This 1 also had the central stellate-like scar that was seen. The CT much better demonstrates a left hepatic arterial branches coursing into this exophytic lesion proving its hepatic origin. Lesion of similar intensity was seen in segment 5/6 measuring 2.3 x 3.6 cm as well. The biliary tree appeared to be normal, spleen was normal, pancreas was normal, kidney showed a renal cyst. They felt that these lesions in the liver are very suggestive of focal nodular hyperplasia. The largest being an exophytic lesion arising from the left lobe measuring 7.9 cm. The did recommend further evaluation with a Eovist for definitive diagnosis. Spoke with pt: Explained need the mri with eovist to be done to better see the liver lesions. Also mentioned to have primary MD review the chest ct nonspecific findings seen. She stays liver lesions first noted 3/18 with hospital for epigastric/chest pain. Went to [facility] and CT showed a liver lesion. MRI showed 2 lesions, one larger to center and to left of the liver 8 x 3 cm and several other smaller ones. Normal cardiac work up. She has hx of OCP use many years ago (about 2 years total) and was on estradiol patch March 3. On March 17, she developed pain and stopped patch March 24. Testosterone pellets x 1.5 years ago, last in Feb 2021. Also hx of progesterone supplementation with one prior pregnancy. 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  Plan: 1. July mri and labs.  2. Ask Dr Santiago re the larger adenoma 4.2 to 4.5 to 4.7cm to 5cm. The radiologist said stable but to me subtle trend. 3. Pt off the hormones 3 yrs plus and hoping settle. 4. If not the adenoma is the concern.  Duration of the visit was 40  minutes with 15 minutes of chart prep and 25 minutes for the [facility] telemed visit with time spent reviewing recent records current status and future plans for the patient.

## 2025-02-10 ENCOUNTER — OFFICE VISIT (OUTPATIENT)
Dept: URBAN - METROPOLITAN AREA CLINIC 86 | Facility: CLINIC | Age: 60
End: 2025-02-10

## 2025-02-16 ENCOUNTER — WEB ENCOUNTER (OUTPATIENT)
Dept: URBAN - METROPOLITAN AREA CLINIC 86 | Facility: CLINIC | Age: 60
End: 2025-02-16

## 2025-02-16 ENCOUNTER — WEB ENCOUNTER (OUTPATIENT)
Dept: URBAN - METROPOLITAN AREA CLINIC 82 | Facility: CLINIC | Age: 60
End: 2025-02-16

## 2025-02-18 ENCOUNTER — WEB ENCOUNTER (OUTPATIENT)
Dept: URBAN - METROPOLITAN AREA CLINIC 86 | Facility: CLINIC | Age: 60
End: 2025-02-18

## 2025-02-20 ENCOUNTER — WEB ENCOUNTER (OUTPATIENT)
Dept: URBAN - METROPOLITAN AREA CLINIC 86 | Facility: CLINIC | Age: 60
End: 2025-02-20

## 2025-04-05 ENCOUNTER — WEB ENCOUNTER (OUTPATIENT)
Dept: URBAN - METROPOLITAN AREA CLINIC 86 | Facility: CLINIC | Age: 60
End: 2025-04-05

## 2025-04-12 ENCOUNTER — TELEPHONE ENCOUNTER (OUTPATIENT)
Dept: URBAN - METROPOLITAN AREA CLINIC 86 | Facility: CLINIC | Age: 60
End: 2025-04-12

## 2025-04-12 NOTE — HPI-TODAY'S VISIT:
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  The describe how you had a hx of multiple FNH amd adenomas and concern re the current segment six 5 cm adenoma and the prior history of the laparoscopic left segment 2/3 resection for multiple FNH and adenomatous lesions.    They mentioned that they had to do robotic lysis of adhesions for 30 minutes.    It was a combined case between Dr. Santiago and Dr. Turpin.    March 28 labs showed total protein 6.5 albumin 3.8 bilirubin 0.7 and direct liver 0.12 alk phos 69 AST 33 ALT 23.    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  Adenoma was present at the parenchymal resection margin.    They did see extensive macrovesicular steatosis within the lesional hepatocytes and numerous arterioles of varying caliber in the lesion.    Very glad that you had the surgery done especially given the size seen on explant.    Dr. Hernandez

## 2025-04-17 ENCOUNTER — WEB ENCOUNTER (OUTPATIENT)
Dept: URBAN - METROPOLITAN AREA CLINIC 86 | Facility: CLINIC | Age: 60
End: 2025-04-17

## 2025-07-23 ENCOUNTER — APPOINTMENT (OUTPATIENT)
Dept: URBAN - METROPOLITAN AREA CLINIC 210 | Age: 60
Setting detail: DERMATOLOGY
End: 2025-07-23

## 2025-07-23 DIAGNOSIS — D485 NEOPLASM OF UNCERTAIN BEHAVIOR OF SKIN: ICD-10-CM

## 2025-07-23 PROBLEM — D48.5 NEOPLASM OF UNCERTAIN BEHAVIOR OF SKIN: Status: ACTIVE | Noted: 2025-07-23

## 2025-07-23 PROCEDURE — OTHER BIOPSY BY SHAVE METHOD: OTHER

## 2025-07-23 PROCEDURE — 11102 TANGNTL BX SKIN SINGLE LES: CPT

## 2025-07-23 PROCEDURE — OTHER MIPS QUALITY: OTHER

## 2025-07-23 ASSESSMENT — LOCATION SIMPLE DESCRIPTION DERM: LOCATION SIMPLE: LEFT BREAST

## 2025-07-23 ASSESSMENT — LOCATION DETAILED DESCRIPTION DERM: LOCATION DETAILED: LEFT MEDIAL BREAST 11-12:00 REGION

## 2025-07-23 ASSESSMENT — LOCATION ZONE DERM: LOCATION ZONE: TRUNK

## 2025-07-28 ENCOUNTER — LAB OUTSIDE AN ENCOUNTER (OUTPATIENT)
Dept: URBAN - METROPOLITAN AREA TELEHEALTH 2 | Facility: TELEHEALTH | Age: 60
End: 2025-07-28

## 2025-07-31 ENCOUNTER — LAB OUTSIDE AN ENCOUNTER (OUTPATIENT)
Dept: URBAN - METROPOLITAN AREA TELEHEALTH 2 | Facility: TELEHEALTH | Age: 60
End: 2025-07-31

## 2025-08-18 ENCOUNTER — APPOINTMENT (OUTPATIENT)
Dept: URBAN - METROPOLITAN AREA CLINIC 210 | Age: 60
Setting detail: DERMATOLOGY
End: 2025-08-28

## 2025-08-18 DIAGNOSIS — L64.8 OTHER ANDROGENIC ALOPECIA: ICD-10-CM

## 2025-08-18 DIAGNOSIS — D18.0 HEMANGIOMA: ICD-10-CM

## 2025-08-18 DIAGNOSIS — Z12.83 ENCOUNTER FOR SCREENING FOR MALIGNANT NEOPLASM OF SKIN: ICD-10-CM

## 2025-08-18 DIAGNOSIS — L72.8 OTHER FOLLICULAR CYSTS OF THE SKIN AND SUBCUTANEOUS TISSUE: ICD-10-CM

## 2025-08-18 DIAGNOSIS — L82.1 OTHER SEBORRHEIC KERATOSIS: ICD-10-CM

## 2025-08-18 PROBLEM — D23.72 OTHER BENIGN NEOPLASM OF SKIN OF LEFT LOWER LIMB, INCLUDING HIP: Status: ACTIVE | Noted: 2025-08-18

## 2025-08-18 PROBLEM — D18.01 HEMANGIOMA OF SKIN AND SUBCUTANEOUS TISSUE: Status: ACTIVE | Noted: 2025-08-18

## 2025-08-18 PROBLEM — D23.71 OTHER BENIGN NEOPLASM OF SKIN OF RIGHT LOWER LIMB, INCLUDING HIP: Status: ACTIVE | Noted: 2025-08-18

## 2025-08-18 PROCEDURE — OTHER REASSURANCE: OTHER

## 2025-08-18 PROCEDURE — 99213 OFFICE O/P EST LOW 20 MIN: CPT

## 2025-08-18 PROCEDURE — OTHER PRESCRIPTION: OTHER

## 2025-08-18 PROCEDURE — OTHER MIPS QUALITY: OTHER

## 2025-08-18 PROCEDURE — OTHER COUNSELING: OTHER

## 2025-08-18 RX ORDER — MINOXIDIL 2.5 MG/1
TABLET ORAL
Qty: 45 | Refills: 3 | Status: ERX | COMMUNITY
Start: 2025-08-18

## 2025-08-18 ASSESSMENT — LOCATION ZONE DERM
LOCATION ZONE: SCALP
LOCATION ZONE: AXILLAE
LOCATION ZONE: ARM
LOCATION ZONE: TRUNK

## 2025-08-18 ASSESSMENT — LOCATION SIMPLE DESCRIPTION DERM
LOCATION SIMPLE: RIGHT AXILLARY VAULT
LOCATION SIMPLE: ABDOMEN
LOCATION SIMPLE: LEFT SCALP
LOCATION SIMPLE: POSTERIOR SCALP
LOCATION SIMPLE: CHEST
LOCATION SIMPLE: LEFT SHOULDER

## 2025-08-18 ASSESSMENT — LOCATION DETAILED DESCRIPTION DERM
LOCATION DETAILED: LEFT MEDIAL SUPERIOR CHEST
LOCATION DETAILED: RIGHT AXILLARY VAULT
LOCATION DETAILED: PERIUMBILICAL SKIN
LOCATION DETAILED: LEFT MEDIAL FRONTAL SCALP
LOCATION DETAILED: LEFT ANTERIOR SHOULDER
LOCATION DETAILED: POSTERIOR MID-PARIETAL SCALP